# Patient Record
Sex: FEMALE | Race: BLACK OR AFRICAN AMERICAN | NOT HISPANIC OR LATINO | Employment: FULL TIME | URBAN - METROPOLITAN AREA
[De-identification: names, ages, dates, MRNs, and addresses within clinical notes are randomized per-mention and may not be internally consistent; named-entity substitution may affect disease eponyms.]

---

## 2017-06-14 ENCOUNTER — HISTORICAL (OUTPATIENT)
Dept: FAMILY MEDICINE | Facility: CLINIC | Age: 27
End: 2017-06-14

## 2018-02-05 ENCOUNTER — HISTORICAL (OUTPATIENT)
Dept: ADMINISTRATIVE | Facility: HOSPITAL | Age: 28
End: 2018-02-05

## 2018-02-05 LAB
ABS NEUT (OLG): 2.38 X10(3)/MCL
ALBUMIN SERPL-MCNC: 4.2 GM/DL (ref 3.4–5)
ALBUMIN/GLOB SERPL: 1 RATIO (ref 1–2)
ALP SERPL-CCNC: 76 UNIT/L (ref 45–117)
ALT SERPL-CCNC: 34 UNIT/L (ref 12–78)
AST SERPL-CCNC: 24 UNIT/L (ref 15–37)
BASOPHILS NFR BLD MANUAL: 1 %
BILIRUB SERPL-MCNC: 0.5 MG/DL (ref 0.2–1)
BILIRUBIN DIRECT+TOT PNL SERPL-MCNC: 0.1 MG/DL
BILIRUBIN DIRECT+TOT PNL SERPL-MCNC: 0.4 MG/DL
BUN SERPL-MCNC: 10 MG/DL (ref 7–18)
CALCIUM SERPL-MCNC: 9.4 MG/DL (ref 8.5–10.1)
CHLORIDE SERPL-SCNC: 105 MMOL/L (ref 98–107)
CO2 SERPL-SCNC: 24 MMOL/L (ref 21–32)
CREAT SERPL-MCNC: 0.8 MG/DL (ref 0.6–1.3)
D DIMER PPP IA.FEU-MCNC: 0.28 MCG/ML FEU
EOSINOPHIL NFR BLD MANUAL: 0 %
ERYTHROCYTE [DISTWIDTH] IN BLOOD BY AUTOMATED COUNT: 12.8 % (ref 11.5–14.5)
GLOBULIN SER-MCNC: 4.5 GM/ML (ref 2.3–3.5)
GLUCOSE SERPL-MCNC: 86 MG/DL (ref 74–106)
GRANULOCYTES NFR BLD MANUAL: 56 % (ref 43–75)
HCT VFR BLD AUTO: 39.7 % (ref 35–46)
HGB BLD-MCNC: 13 GM/DL (ref 12–16)
LYMPHOCYTES NFR BLD MANUAL: 31 % (ref 20.5–51.1)
MCH RBC QN AUTO: 29.1 PG (ref 26–34)
MCHC RBC AUTO-ENTMCNC: 32.7 GM/DL (ref 31–37)
MCV RBC AUTO: 88.8 FL (ref 80–100)
MONOCYTES NFR BLD MANUAL: 7 % (ref 2–9)
PLATELET # BLD AUTO: 303 X10(3)/MCL (ref 130–400)
PLATELET # BLD EST: ADEQUATE 10*3/UL
PMV BLD AUTO: 10.9 FL (ref 7.4–10.4)
POTASSIUM SERPL-SCNC: 4 MMOL/L (ref 3.5–5.1)
PROT SERPL-MCNC: 8.7 GM/DL (ref 6.4–8.2)
RBC # BLD AUTO: 4.47 X10(6)/MCL (ref 4–5.2)
RBC MORPH BLD: NORMAL
SODIUM SERPL-SCNC: 138 MMOL/L (ref 136–145)
T4 SERPL-MCNC: 11.4 MCG/DL (ref 4.8–13.9)
TSH SERPL-ACNC: 0.72 MIU/L (ref 0.36–3.74)
WBC # SPEC AUTO: 4.7 X10(3)/MCL (ref 4.5–11)

## 2018-02-14 ENCOUNTER — HISTORICAL (OUTPATIENT)
Dept: RADIOLOGY | Facility: HOSPITAL | Age: 28
End: 2018-02-14

## 2018-03-09 ENCOUNTER — HISTORICAL (OUTPATIENT)
Dept: FAMILY MEDICINE | Facility: CLINIC | Age: 28
End: 2018-03-09

## 2018-04-09 ENCOUNTER — HISTORICAL (OUTPATIENT)
Dept: FAMILY MEDICINE | Facility: CLINIC | Age: 28
End: 2018-04-09

## 2018-04-09 LAB
ABS NEUT (OLG): 3.25 X10(3)/MCL (ref 2.1–9.2)
ALBUMIN SERPL-MCNC: 3.9 GM/DL (ref 3.4–5)
ALBUMIN/GLOB SERPL: 1 RATIO (ref 1–2)
ALP SERPL-CCNC: 82 UNIT/L (ref 45–117)
ALT SERPL-CCNC: 19 UNIT/L (ref 12–78)
AMPHET UR QL SCN: NEGATIVE
AST SERPL-CCNC: 14 UNIT/L (ref 15–37)
BARBITURATE SCN PRESENT UR: NEGATIVE
BASOPHILS # BLD AUTO: 0.02 X10(3)/MCL
BASOPHILS NFR BLD AUTO: 0 %
BENZODIAZ UR QL SCN: NEGATIVE
BILIRUB SERPL-MCNC: 0.3 MG/DL (ref 0.2–1)
BILIRUBIN DIRECT+TOT PNL SERPL-MCNC: 0.1 MG/DL
BILIRUBIN DIRECT+TOT PNL SERPL-MCNC: 0.2 MG/DL
BUN SERPL-MCNC: 11 MG/DL (ref 7–18)
CALCIUM SERPL-MCNC: 9 MG/DL (ref 8.5–10.1)
CANNABINOIDS UR QL SCN: NEGATIVE
CHLORIDE SERPL-SCNC: 106 MMOL/L (ref 98–107)
CO2 SERPL-SCNC: 26 MMOL/L (ref 21–32)
COCAINE UR QL SCN: NEGATIVE
CREAT SERPL-MCNC: 1.1 MG/DL (ref 0.6–1.3)
CRP SERPL-MCNC: <0.3 MG/DL
EOSINOPHIL # BLD AUTO: 0.05 X10(3)/MCL
EOSINOPHIL NFR BLD AUTO: 1 %
ERYTHROCYTE [DISTWIDTH] IN BLOOD BY AUTOMATED COUNT: 12.9 % (ref 11.5–14.5)
ERYTHROCYTE [SEDIMENTATION RATE] IN BLOOD: 18 MM/HR (ref 0–20)
EST. AVERAGE GLUCOSE BLD GHB EST-MCNC: 91 MG/DL
GLOBULIN SER-MCNC: 4.3 GM/ML (ref 2.3–3.5)
GLUCOSE SERPL-MCNC: 96 MG/DL (ref 74–106)
HBA1C MFR BLD: 4.8 % (ref 4.2–6.3)
HCT VFR BLD AUTO: 37.6 % (ref 35–46)
HGB BLD-MCNC: 11.7 GM/DL (ref 12–16)
HIV 1+2 AB+HIV1 P24 AG SERPL QL IA: NONREACTIVE
IMM GRANULOCYTES # BLD AUTO: 0.01 10*3/UL
IMM GRANULOCYTES NFR BLD AUTO: 0 %
LYMPHOCYTES # BLD AUTO: 2.43 X10(3)/MCL
LYMPHOCYTES NFR BLD AUTO: 40 % (ref 13–40)
MCH RBC QN AUTO: 28.5 PG (ref 26–34)
MCHC RBC AUTO-ENTMCNC: 31.1 GM/DL (ref 31–37)
MCV RBC AUTO: 91.7 FL (ref 80–100)
MONOCYTES # BLD AUTO: 0.39 X10(3)/MCL
MONOCYTES NFR BLD AUTO: 6 % (ref 4–12)
NEUTROPHILS # BLD AUTO: 3.25 X10(3)/MCL
NEUTROPHILS NFR BLD AUTO: 53 X10(3)/MCL
OPIATES UR QL SCN: NEGATIVE
PCP UR QL: NEGATIVE
PH UR STRIP.AUTO: 5.5 [PH] (ref 5–8)
PLATELET # BLD AUTO: 295 X10(3)/MCL (ref 130–400)
PMV BLD AUTO: 10.4 FL (ref 7.4–10.4)
POTASSIUM SERPL-SCNC: 3.5 MMOL/L (ref 3.5–5.1)
PROT SERPL-MCNC: 8.2 GM/DL (ref 6.4–8.2)
RBC # BLD AUTO: 4.1 X10(6)/MCL (ref 4–5.2)
SODIUM SERPL-SCNC: 136 MMOL/L (ref 136–145)
TEMPERATURE, URINE (OHS): 25 DEGC (ref 20–25)
TSH SERPL-ACNC: 0.54 MIU/L (ref 0.36–3.74)
WBC # SPEC AUTO: 6.2 X10(3)/MCL (ref 4.5–11)

## 2018-05-08 ENCOUNTER — HISTORICAL (OUTPATIENT)
Dept: RADIOLOGY | Facility: HOSPITAL | Age: 28
End: 2018-05-08

## 2018-06-01 ENCOUNTER — HISTORICAL (OUTPATIENT)
Dept: RADIOLOGY | Facility: HOSPITAL | Age: 28
End: 2018-06-01

## 2018-11-02 ENCOUNTER — HISTORICAL (OUTPATIENT)
Dept: LAB | Facility: HOSPITAL | Age: 28
End: 2018-11-02

## 2018-11-02 LAB
ABS NEUT (OLG): 2.68 X10(3)/MCL (ref 2.1–9.2)
BASOPHILS # BLD AUTO: 0.02 X10(3)/MCL
BASOPHILS NFR BLD AUTO: 0 %
EOSINOPHIL # BLD AUTO: 0.07 X10(3)/MCL
EOSINOPHIL NFR BLD AUTO: 1 %
ERYTHROCYTE [DISTWIDTH] IN BLOOD BY AUTOMATED COUNT: 12.6 % (ref 11.5–14.5)
ERYTHROCYTE [SEDIMENTATION RATE] IN BLOOD: 13 MM/HR (ref 0–20)
HCT VFR BLD AUTO: 39.1 % (ref 35–46)
HGB BLD-MCNC: 12.4 GM/DL (ref 12–16)
IMM GRANULOCYTES # BLD AUTO: 0.01 10*3/UL
IMM GRANULOCYTES NFR BLD AUTO: 0 %
LYMPHOCYTES # BLD AUTO: 2.42 X10(3)/MCL
LYMPHOCYTES NFR BLD AUTO: 43 % (ref 13–40)
MCH RBC QN AUTO: 28.6 PG (ref 26–34)
MCHC RBC AUTO-ENTMCNC: 31.7 GM/DL (ref 31–37)
MCV RBC AUTO: 90.1 FL (ref 80–100)
MONOCYTES # BLD AUTO: 0.4 X10(3)/MCL
MONOCYTES NFR BLD AUTO: 7 % (ref 4–12)
NEUTROPHILS # BLD AUTO: 2.68 X10(3)/MCL
NEUTROPHILS NFR BLD AUTO: 48 X10(3)/MCL
PLATELET # BLD AUTO: 301 X10(3)/MCL (ref 130–400)
PMV BLD AUTO: 10.4 FL (ref 7.4–10.4)
RBC # BLD AUTO: 4.34 X10(6)/MCL (ref 4–5.2)
WBC # SPEC AUTO: 5.6 X10(3)/MCL (ref 4.5–11)

## 2018-11-16 ENCOUNTER — HISTORICAL (OUTPATIENT)
Dept: ADMINISTRATIVE | Facility: HOSPITAL | Age: 28
End: 2018-11-16

## 2018-11-16 LAB
T3FREE SERPL-MCNC: 2.26 PG/ML (ref 2.18–3.98)
T4 FREE SERPL-MCNC: 0.92 NG/DL (ref 0.76–1.46)
TSH SERPL-ACNC: 0.43 MIU/L (ref 0.36–3.74)

## 2019-07-29 ENCOUNTER — HISTORICAL (OUTPATIENT)
Dept: ADMINISTRATIVE | Facility: HOSPITAL | Age: 29
End: 2019-07-29

## 2019-07-29 LAB
ABS NEUT (OLG): 2.12 X10(3)/MCL (ref 2.1–9.2)
ALBUMIN SERPL-MCNC: 4.1 GM/DL (ref 3.4–5)
ALBUMIN/GLOB SERPL: 1 RATIO (ref 1.1–2)
ALP SERPL-CCNC: 77 UNIT/L (ref 45–117)
ALT SERPL-CCNC: 19 UNIT/L (ref 12–78)
APPEARANCE, UA: ABNORMAL
AST SERPL-CCNC: 12 UNIT/L (ref 15–37)
BACTERIA #/AREA URNS AUTO: ABNORMAL /[HPF]
BASOPHILS # BLD AUTO: 0.02 X10(3)/MCL
BASOPHILS NFR BLD AUTO: 0 %
BILIRUB SERPL-MCNC: 0.4 MG/DL (ref 0.2–1)
BILIRUB UR QL STRIP: NEGATIVE
BILIRUBIN DIRECT+TOT PNL SERPL-MCNC: 0.1 MG/DL
BILIRUBIN DIRECT+TOT PNL SERPL-MCNC: 0.3 MG/DL
BUN SERPL-MCNC: 12 MG/DL (ref 7–18)
CALCIUM SERPL-MCNC: 9.5 MG/DL (ref 8.5–10.1)
CHLORIDE SERPL-SCNC: 107 MMOL/L (ref 98–107)
CO2 SERPL-SCNC: 27 MMOL/L (ref 21–32)
COLOR UR: ABNORMAL
CREAT SERPL-MCNC: 0.9 MG/DL (ref 0.6–1.3)
CREAT UR-MCNC: 485 MG/DL
EOSINOPHIL # BLD AUTO: 0.05 10*3/UL
EOSINOPHIL NFR BLD AUTO: 1 %
ERYTHROCYTE [DISTWIDTH] IN BLOOD BY AUTOMATED COUNT: 13.5 % (ref 11.5–14.5)
EST. AVERAGE GLUCOSE BLD GHB EST-MCNC: 91 MG/DL
GLOBULIN SER-MCNC: 4 GM/ML (ref 2.3–3.5)
GLUCOSE (UA): NORMAL
GLUCOSE SERPL-MCNC: 81 MG/DL (ref 74–106)
HBA1C MFR BLD: 4.8 % (ref 4.2–6.3)
HCT VFR BLD AUTO: 38.9 % (ref 35–46)
HGB BLD-MCNC: 11.9 GM/DL (ref 12–16)
HGB UR QL STRIP: 0.1 MG/DL
HYALINE CASTS #/AREA URNS LPF: ABNORMAL /[LPF]
KETONES UR QL STRIP: ABNORMAL
LEUKOCYTE ESTERASE UR QL STRIP: NEGATIVE
LYMPHOCYTES # BLD AUTO: 2.13 X10(3)/MCL
LYMPHOCYTES NFR BLD AUTO: 46 % (ref 13–40)
MCH RBC QN AUTO: 27.5 PG (ref 26–34)
MCHC RBC AUTO-ENTMCNC: 30.6 GM/DL (ref 31–37)
MCV RBC AUTO: 89.8 FL (ref 80–100)
MICROALBUMIN UR-MCNC: 20.3 MG/L (ref 0–19)
MICROALBUMIN/CREAT RATIO PNL UR: 4.2 MCG/MG CR (ref 0–29)
MONOCYTES # BLD AUTO: 0.33 X10(3)/MCL
MONOCYTES NFR BLD AUTO: 7 % (ref 0–24)
NEUTROPHILS # BLD AUTO: 2.12 X10(3)/MCL
NEUTROPHILS NFR BLD AUTO: 46 X10(3)/MCL
NITRITE UR QL STRIP: NEGATIVE
PH UR STRIP: 5.5 [PH] (ref 4.5–8)
PLATELET # BLD AUTO: 307 X10(3)/MCL (ref 130–400)
PMV BLD AUTO: 10.6 FL (ref 7.4–10.4)
POTASSIUM SERPL-SCNC: 3.7 MMOL/L (ref 3.5–5.1)
PROT SERPL-MCNC: 8.1 GM/DL (ref 6.4–8.2)
PROT UR QL STRIP: 20 MG/DL
RBC # BLD AUTO: 4.33 X10(6)/MCL (ref 4–5.2)
RBC #/AREA URNS AUTO: ABNORMAL /[HPF]
SODIUM SERPL-SCNC: 140 MMOL/L (ref 136–145)
SP GR UR STRIP: 1.04 (ref 1–1.03)
SQUAMOUS #/AREA URNS LPF: ABNORMAL /[LPF]
T4 FREE SERPL-MCNC: 0.95 NG/DL (ref 0.76–1.46)
TSH SERPL-ACNC: 0.49 MIU/L (ref 0.36–3.74)
UROBILINOGEN UR STRIP-ACNC: NORMAL
WBC # SPEC AUTO: 4.6 X10(3)/MCL (ref 4.5–11)
WBC #/AREA URNS AUTO: ABNORMAL /HPF

## 2019-09-06 ENCOUNTER — HISTORICAL (OUTPATIENT)
Dept: RADIOLOGY | Facility: HOSPITAL | Age: 29
End: 2019-09-06

## 2020-05-29 ENCOUNTER — HISTORICAL (OUTPATIENT)
Dept: ADMINISTRATIVE | Facility: HOSPITAL | Age: 30
End: 2020-05-29

## 2020-05-29 LAB
CHOLEST SERPL-MCNC: 202 MG/DL
CHOLEST/HDLC SERPL: 3.8 {RATIO} (ref 0–4.4)
HDLC SERPL-MCNC: 53 MG/DL (ref 40–59)
LDLC SERPL CALC-MCNC: 125 MG/DL
POC BETA-HCG (QUAL): NEGATIVE
TRIGL SERPL-MCNC: 122 MG/DL
VLDLC SERPL CALC-MCNC: 24 MG/DL

## 2020-10-22 ENCOUNTER — HISTORICAL (OUTPATIENT)
Dept: INTERNAL MEDICINE | Facility: CLINIC | Age: 30
End: 2020-10-22

## 2020-10-22 LAB
ABS NEUT (OLG): 2.93 X10(3)/MCL (ref 2.1–9.2)
ALBUMIN SERPL-MCNC: 4.1 GM/DL (ref 3.5–5)
ALBUMIN/GLOB SERPL: 1.1 RATIO (ref 1.1–2)
ALP SERPL-CCNC: 62 UNIT/L (ref 40–150)
ALT SERPL-CCNC: 14 UNIT/L (ref 0–55)
AST SERPL-CCNC: 21 UNIT/L (ref 5–34)
BASOPHILS # BLD AUTO: 0 X10(3)/MCL (ref 0–0.2)
BASOPHILS NFR BLD AUTO: 0 %
BILIRUB SERPL-MCNC: 0.3 MG/DL
BILIRUBIN DIRECT+TOT PNL SERPL-MCNC: 0.1 MG/DL (ref 0–0.5)
BILIRUBIN DIRECT+TOT PNL SERPL-MCNC: 0.2 MG/DL (ref 0–0.8)
BUN SERPL-MCNC: 12 MG/DL (ref 7–18.7)
CALCIUM SERPL-MCNC: 9.5 MG/DL (ref 8.4–10.2)
CHLORIDE SERPL-SCNC: 107 MMOL/L (ref 98–107)
CHOLEST SERPL-MCNC: 162 MG/DL
CHOLEST/HDLC SERPL: 4 {RATIO} (ref 0–5)
CO2 SERPL-SCNC: 22 MMOL/L (ref 22–29)
CREAT SERPL-MCNC: 0.89 MG/DL (ref 0.55–1.02)
DEPRECATED CALCIDIOL+CALCIFEROL SERPL-MC: 13.1 NG/ML (ref 30–80)
EOSINOPHIL # BLD AUTO: 0.1 X10(3)/MCL (ref 0–0.9)
EOSINOPHIL NFR BLD AUTO: 1 %
ERYTHROCYTE [DISTWIDTH] IN BLOOD BY AUTOMATED COUNT: 12.9 % (ref 11.5–14.5)
EST. AVERAGE GLUCOSE BLD GHB EST-MCNC: 88.2 MG/DL
GLOBULIN SER-MCNC: 3.9 GM/DL (ref 2.4–3.5)
GLUCOSE SERPL-MCNC: 80 MG/DL (ref 74–100)
HBA1C MFR BLD: 4.7 %
HCT VFR BLD AUTO: 38.5 % (ref 35–46)
HDLC SERPL-MCNC: 46 MG/DL (ref 35–60)
HGB BLD-MCNC: 12 GM/DL (ref 12–16)
IMM GRANULOCYTES # BLD AUTO: 0.01 10*3/UL
IMM GRANULOCYTES NFR BLD AUTO: 0 %
LDLC SERPL CALC-MCNC: 96 MG/DL (ref 50–140)
LYMPHOCYTES # BLD AUTO: 2.7 X10(3)/MCL (ref 0.6–4.6)
LYMPHOCYTES NFR BLD AUTO: 43 %
MCH RBC QN AUTO: 28.2 PG (ref 26–34)
MCHC RBC AUTO-ENTMCNC: 31.2 GM/DL (ref 31–37)
MCV RBC AUTO: 90.6 FL (ref 80–100)
MONOCYTES # BLD AUTO: 0.5 X10(3)/MCL (ref 0.1–1.3)
MONOCYTES NFR BLD AUTO: 8 %
NEUTROPHILS # BLD AUTO: 2.93 X10(3)/MCL (ref 2.1–9.2)
NEUTROPHILS NFR BLD AUTO: 47 %
PLATELET # BLD AUTO: 326 X10(3)/MCL (ref 130–400)
PMV BLD AUTO: 11 FL (ref 7.4–10.4)
POTASSIUM SERPL-SCNC: 3.7 MMOL/L (ref 3.5–5.1)
PROT SERPL-MCNC: 8 GM/DL (ref 6.4–8.3)
RBC # BLD AUTO: 4.25 X10(6)/MCL (ref 4–5.2)
SODIUM SERPL-SCNC: 137 MMOL/L (ref 136–145)
TRIGL SERPL-MCNC: 98 MG/DL (ref 37–140)
TSH SERPL-ACNC: 0.51 UIU/ML (ref 0.35–4.94)
VLDLC SERPL CALC-MCNC: 20 MG/DL
WBC # SPEC AUTO: 6.2 X10(3)/MCL (ref 4.5–11)

## 2020-12-18 ENCOUNTER — HISTORICAL (OUTPATIENT)
Dept: RADIOLOGY | Facility: HOSPITAL | Age: 30
End: 2020-12-18

## 2021-01-27 ENCOUNTER — HISTORICAL (OUTPATIENT)
Dept: RADIOLOGY | Facility: HOSPITAL | Age: 31
End: 2021-01-27

## 2021-02-08 ENCOUNTER — HISTORICAL (OUTPATIENT)
Dept: RADIOLOGY | Facility: HOSPITAL | Age: 31
End: 2021-02-08

## 2021-07-01 ENCOUNTER — PATIENT MESSAGE (OUTPATIENT)
Dept: ADMINISTRATIVE | Facility: OTHER | Age: 31
End: 2021-07-01

## 2021-09-27 ENCOUNTER — HISTORICAL (OUTPATIENT)
Dept: RADIOLOGY | Facility: HOSPITAL | Age: 31
End: 2021-09-27

## 2021-11-18 ENCOUNTER — HISTORICAL (OUTPATIENT)
Dept: LAB | Facility: HOSPITAL | Age: 31
End: 2021-11-18

## 2021-11-18 LAB
ABS NEUT (OLG): 2.44 X10(3)/MCL (ref 2.1–9.2)
ALBUMIN SERPL-MCNC: 3.8 GM/DL (ref 3.5–5)
ALBUMIN/GLOB SERPL: 1 RATIO (ref 1.1–2)
ALP SERPL-CCNC: 103 UNIT/L (ref 40–150)
ALT SERPL-CCNC: 65 UNIT/L (ref 0–55)
AST SERPL-CCNC: 41 UNIT/L (ref 5–34)
BASOPHILS # BLD AUTO: 0 X10(3)/MCL (ref 0–0.2)
BASOPHILS NFR BLD AUTO: 0 %
BILIRUB SERPL-MCNC: 0.4 MG/DL
BILIRUBIN DIRECT+TOT PNL SERPL-MCNC: 0.2 MG/DL (ref 0–0.5)
BILIRUBIN DIRECT+TOT PNL SERPL-MCNC: 0.2 MG/DL (ref 0–0.8)
BUN SERPL-MCNC: 11.6 MG/DL (ref 7–18.7)
CALCIUM SERPL-MCNC: 9.3 MG/DL (ref 8.7–10.5)
CHLORIDE SERPL-SCNC: 107 MMOL/L (ref 98–107)
CHOLEST SERPL-MCNC: 163 MG/DL
CHOLEST/HDLC SERPL: 3 {RATIO} (ref 0–5)
CO2 SERPL-SCNC: 22 MMOL/L (ref 22–29)
CREAT SERPL-MCNC: 0.86 MG/DL (ref 0.55–1.02)
DEPRECATED CALCIDIOL+CALCIFEROL SERPL-MC: 14.4 NG/ML (ref 30–80)
EOSINOPHIL # BLD AUTO: 0.1 X10(3)/MCL (ref 0–0.9)
EOSINOPHIL NFR BLD AUTO: 2 %
ERYTHROCYTE [DISTWIDTH] IN BLOOD BY AUTOMATED COUNT: 17.5 % (ref 11.5–14.5)
GLOBULIN SER-MCNC: 3.8 GM/DL (ref 2.4–3.5)
GLUCOSE SERPL-MCNC: 106 MG/DL (ref 74–100)
HCT VFR BLD AUTO: 36.8 % (ref 35–46)
HDLC SERPL-MCNC: 49 MG/DL (ref 35–60)
HGB BLD-MCNC: 11 GM/DL (ref 12–16)
IMM GRANULOCYTES # BLD AUTO: 0.02 10*3/UL
IMM GRANULOCYTES NFR BLD AUTO: 0 %
LDLC SERPL CALC-MCNC: 92 MG/DL (ref 50–140)
LYMPHOCYTES # BLD AUTO: 1.9 X10(3)/MCL (ref 0.6–4.6)
LYMPHOCYTES NFR BLD AUTO: 39 %
MCH RBC QN AUTO: 25.8 PG (ref 26–34)
MCHC RBC AUTO-ENTMCNC: 29.9 GM/DL (ref 31–37)
MCV RBC AUTO: 86.2 FL (ref 80–100)
MONOCYTES # BLD AUTO: 0.3 X10(3)/MCL (ref 0.1–1.3)
MONOCYTES NFR BLD AUTO: 7 %
NEUTROPHILS # BLD AUTO: 2.44 X10(3)/MCL (ref 2.1–9.2)
NEUTROPHILS NFR BLD AUTO: 52 %
NRBC BLD AUTO-RTO: 0 % (ref 0–0.2)
PLATELET # BLD AUTO: 251 X10(3)/MCL (ref 130–400)
PMV BLD AUTO: 10.3 FL (ref 7.4–10.4)
POTASSIUM SERPL-SCNC: 3.5 MMOL/L (ref 3.5–5.1)
PROT SERPL-MCNC: 7.6 GM/DL (ref 6.4–8.3)
RBC # BLD AUTO: 4.27 X10(6)/MCL (ref 4–5.2)
SODIUM SERPL-SCNC: 139 MMOL/L (ref 136–145)
TRIGL SERPL-MCNC: 108 MG/DL (ref 37–140)
TSH SERPL-ACNC: 0.57 UIU/ML (ref 0.35–4.94)
VLDLC SERPL CALC-MCNC: 22 MG/DL
WBC # SPEC AUTO: 4.7 X10(3)/MCL (ref 4.5–11)

## 2021-12-21 LAB — PAP RECOMMENDATION EXT: NORMAL

## 2022-04-11 ENCOUNTER — HISTORICAL (OUTPATIENT)
Dept: ADMINISTRATIVE | Facility: HOSPITAL | Age: 32
End: 2022-04-11
Payer: MEDICAID

## 2022-04-25 VITALS
DIASTOLIC BLOOD PRESSURE: 81 MMHG | BODY MASS INDEX: 42.43 KG/M2 | WEIGHT: 270.31 LBS | HEIGHT: 67 IN | SYSTOLIC BLOOD PRESSURE: 124 MMHG | OXYGEN SATURATION: 97 %

## 2022-06-02 ENCOUNTER — HOSPITAL ENCOUNTER (OUTPATIENT)
Dept: RADIOLOGY | Facility: HOSPITAL | Age: 32
Discharge: HOME | End: 2022-06-02
Attending: NURSE PRACTITIONER
Payer: COMMERCIAL

## 2022-06-02 ENCOUNTER — LAB VISIT (OUTPATIENT)
Dept: LAB | Facility: HOSPITAL | Age: 32
End: 2022-06-02
Attending: NURSE PRACTITIONER
Payer: COMMERCIAL

## 2022-06-02 ENCOUNTER — OFFICE VISIT (OUTPATIENT)
Dept: FAMILY MEDICINE | Facility: CLINIC | Age: 32
End: 2022-06-02
Payer: COMMERCIAL

## 2022-06-02 VITALS
BODY MASS INDEX: 43.62 KG/M2 | HEART RATE: 92 BPM | WEIGHT: 271.38 LBS | TEMPERATURE: 98 F | DIASTOLIC BLOOD PRESSURE: 79 MMHG | HEIGHT: 66 IN | SYSTOLIC BLOOD PRESSURE: 123 MMHG

## 2022-06-02 DIAGNOSIS — R59.9 LYMPH NODE ENLARGEMENT: ICD-10-CM

## 2022-06-02 DIAGNOSIS — M79.629 PAIN IN AXILLA, UNSPECIFIED LATERALITY: ICD-10-CM

## 2022-06-02 DIAGNOSIS — R59.9 LYMPH NODE ENLARGEMENT: Primary | ICD-10-CM

## 2022-06-02 LAB
BASOPHILS # BLD AUTO: 0.03 X10(3)/MCL (ref 0–0.2)
BASOPHILS NFR BLD AUTO: 0.7 %
CRP SERPL-MCNC: 0.7 MG/L
EOSINOPHIL # BLD AUTO: 0.09 X10(3)/MCL (ref 0–0.9)
EOSINOPHIL NFR BLD AUTO: 2 %
ERYTHROCYTE [DISTWIDTH] IN BLOOD BY AUTOMATED COUNT: 13.6 % (ref 11.5–17)
ERYTHROCYTE [SEDIMENTATION RATE] IN BLOOD: 20 MM/HR (ref 0–20)
HCT VFR BLD AUTO: 40 % (ref 37–47)
HGB BLD-MCNC: 12.2 GM/DL (ref 12–16)
IMM GRANULOCYTES # BLD AUTO: 0 X10(3)/MCL (ref 0–0.02)
IMM GRANULOCYTES NFR BLD AUTO: 0 % (ref 0–0.43)
LYMPHOCYTES # BLD AUTO: 1.26 X10(3)/MCL (ref 0.6–4.6)
LYMPHOCYTES NFR BLD AUTO: 27.8 %
MCH RBC QN AUTO: 27 PG (ref 27–31)
MCHC RBC AUTO-ENTMCNC: 30.5 MG/DL (ref 33–36)
MCV RBC AUTO: 88.5 FL (ref 80–94)
MONOCYTES # BLD AUTO: 0.39 X10(3)/MCL (ref 0.1–1.3)
MONOCYTES NFR BLD AUTO: 8.6 %
NEUTROPHILS # BLD AUTO: 2.8 X10(3)/MCL (ref 2.1–9.2)
NEUTROPHILS NFR BLD AUTO: 60.9 %
PLATELET # BLD AUTO: 290 X10(3)/MCL (ref 130–400)
PMV BLD AUTO: 10.1 FL (ref 9.4–12.4)
RBC # BLD AUTO: 4.52 X10(6)/MCL (ref 4.2–5.4)
WBC # SPEC AUTO: 4.5 X10(3)/MCL (ref 4.5–11.5)

## 2022-06-02 PROCEDURE — 3078F PR MOST RECENT DIASTOLIC BLOOD PRESSURE < 80 MM HG: ICD-10-PCS | Mod: CPTII,,, | Performed by: NURSE PRACTITIONER

## 2022-06-02 PROCEDURE — 85025 COMPLETE CBC W/AUTO DIFF WBC: CPT

## 2022-06-02 PROCEDURE — 1160F RVW MEDS BY RX/DR IN RCRD: CPT | Mod: CPTII,,, | Performed by: NURSE PRACTITIONER

## 2022-06-02 PROCEDURE — 36415 COLL VENOUS BLD VENIPUNCTURE: CPT

## 2022-06-02 PROCEDURE — 85651 RBC SED RATE NONAUTOMATED: CPT

## 2022-06-02 PROCEDURE — 1159F MED LIST DOCD IN RCRD: CPT | Mod: CPTII,,, | Performed by: NURSE PRACTITIONER

## 2022-06-02 PROCEDURE — 86235 NUCLEAR ANTIGEN ANTIBODY: CPT

## 2022-06-02 PROCEDURE — 71046 X-RAY EXAM CHEST 2 VIEWS: CPT | Mod: TC

## 2022-06-02 PROCEDURE — 3074F SYST BP LT 130 MM HG: CPT | Mod: CPTII,,, | Performed by: NURSE PRACTITIONER

## 2022-06-02 PROCEDURE — 1160F PR REVIEW ALL MEDS BY PRESCRIBER/CLIN PHARMACIST DOCUMENTED: ICD-10-PCS | Mod: CPTII,,, | Performed by: NURSE PRACTITIONER

## 2022-06-02 PROCEDURE — 3008F PR BODY MASS INDEX (BMI) DOCUMENTED: ICD-10-PCS | Mod: CPTII,,, | Performed by: NURSE PRACTITIONER

## 2022-06-02 PROCEDURE — 1159F PR MEDICATION LIST DOCUMENTED IN MEDICAL RECORD: ICD-10-PCS | Mod: CPTII,,, | Performed by: NURSE PRACTITIONER

## 2022-06-02 PROCEDURE — 99213 OFFICE O/P EST LOW 20 MIN: CPT | Mod: ,,, | Performed by: NURSE PRACTITIONER

## 2022-06-02 PROCEDURE — 99213 PR OFFICE/OUTPT VISIT, EST, LEVL III, 20-29 MIN: ICD-10-PCS | Mod: ,,, | Performed by: NURSE PRACTITIONER

## 2022-06-02 PROCEDURE — 3074F PR MOST RECENT SYSTOLIC BLOOD PRESSURE < 130 MM HG: ICD-10-PCS | Mod: CPTII,,, | Performed by: NURSE PRACTITIONER

## 2022-06-02 PROCEDURE — 86140 C-REACTIVE PROTEIN: CPT

## 2022-06-02 PROCEDURE — 3008F BODY MASS INDEX DOCD: CPT | Mod: CPTII,,, | Performed by: NURSE PRACTITIONER

## 2022-06-02 PROCEDURE — 3078F DIAST BP <80 MM HG: CPT | Mod: CPTII,,, | Performed by: NURSE PRACTITIONER

## 2022-06-02 NOTE — PROGRESS NOTES
"Subjective:       Patient ID: Jazmine Welsh is a 31 y.o. female.    Chief Complaint: soreness (Soreness to lymph sites x 1 week )      HPI   This is a 31-year-old  female presents to the clinic today complaining of pain and swelling in her lymph nodes.  She states she has pain in her growing her neck and her arms everywhere that her lymph nodes are present.  She does have a history of breast tumor removed she is following up with high risk breast clinic.   Patient states the pain has been for the last 2 weeks.    She denies any nausea, fever weight loss , or night sweats.     Patient also reports her uncle had no cancer in with her history of breast cancer she is really concerned and would like to follow-up with the oncologist.  She does also report that her left armpit has always a large lump have been sober the her son about 10 years ago that she had a clogged duct   Review of Systems   Constitutional: Negative for appetite change, fatigue, fever and unexpected weight change.   HENT: Negative.    Eyes: Negative.    Respiratory: Negative.  Negative for shortness of breath and wheezing.    Cardiovascular: Negative.    Gastrointestinal: Negative.    Endocrine: Negative.    Genitourinary: Negative.    Integumentary:  Negative.   Allergic/Immunologic: Negative.    Neurological: Negative for dizziness, weakness and headaches.   Hematological: Positive for adenopathy.   Psychiatric/Behavioral: Negative.  Negative for suicidal ideas.   All other systems reviewed and are negative.           The patients  hx, allergies, medication and and problem list were updated as appropriate.    Objective:       /79   Pulse 92   Temp 98 °F (36.7 °C)   Ht 5' 6" (1.676 m)   Wt 123.1 kg (271 lb 6.4 oz)   BMI 43.81 kg/m²      Physical Exam  Constitutional:       Appearance: Normal appearance.   HENT:      Head: Normocephalic.   Eyes:      Pupils: Pupils are equal, round, and reactive to light. "   Cardiovascular:      Rate and Rhythm: Normal rate.      Pulses: Normal pulses.   Pulmonary:      Effort: Pulmonary effort is normal.   Chest:   Breasts:      Right: Axillary adenopathy present.      Left: Axillary adenopathy present.       Abdominal:      General: Bowel sounds are normal.      Palpations: Abdomen is soft.   Musculoskeletal:         General: Normal range of motion.      Cervical back: Neck supple.   Lymphadenopathy:      Cervical: Cervical adenopathy present.      Upper Body:      Right upper body: Axillary adenopathy present.      Left upper body: Axillary adenopathy present.   Skin:     General: Skin is warm and dry.   Neurological:      Mental Status: She is alert and oriented to person, place, and time.   Psychiatric:         Mood and Affect: Mood normal.         Behavior: Behavior normal.         Judgment: Judgment normal.           Assessment/Plan   1. Lymph node enlargement  -     X-Ray Chest PA And Lateral  -     CBC Auto Differential  -     JASMYN  -     C-reactive protein  -     Sedimentation rate    2. Pain in axilla, unspecified laterality  -     X-Ray Chest PA And Lateral  -     CBC Auto Differential  -     JASMYN  -     C-reactive protein  -     Sedimentation rate     pending results - referral to hem/oncology     No results found for this or any previous visit (from the past 24 hour(s)).   No follow-ups on file.       Discussed with pt today labs - wnl   She is still come sound about pain in her groin area in bilateral under arm.  Patient states that she is always suffer with spleen pain. She is very concerned about cancer due to her hx of breast cancer.    she states she is in constant pain and aches in her lymph nodes in the groin and axila.     Chest xray recommends Ct in 3 months - ordered and discussed with pt

## 2022-06-03 LAB
ANTINUCLEAR ANTIBODY SCREEN (OHS): NEGATIVE
CENTROMERE PROTEIN ANTIBODY (OHS): NEGATIVE
DSDNA ANTIBODY (OHS): NEGATIVE
JO-1 ANTIBODY (OHS): NEGATIVE
RNP70 ANTIBODY (OHS): NEGATIVE
SCLERODERMA (SCL-70S) ANTIBODY (OHS): NEGATIVE
SMITH DP IGG (OHS): NEGATIVE
SSA(RO) ANTIBODY (OHS): NEGATIVE
SSB(LA) ANTIBODY (OHS): NEGATIVE
U1RNP ANTIBODY (OHS): NEGATIVE

## 2022-06-27 ENCOUNTER — TELEPHONE (OUTPATIENT)
Dept: FAMILY MEDICINE | Facility: CLINIC | Age: 32
End: 2022-06-27
Payer: COMMERCIAL

## 2022-06-27 DIAGNOSIS — R59.9 LYMPH NODES ENLARGED: Primary | ICD-10-CM

## 2022-06-27 DIAGNOSIS — R59.0 LOCALIZED ENLARGED LYMPH NODES: ICD-10-CM

## 2022-06-27 NOTE — TELEPHONE ENCOUNTER
Spoke with pt- will send referral to [facility]    CT of chest in 3 months     ----- Message from Kiara Caballero LPN sent at 6/24/2022  9:47 AM CDT -----  Regarding: FW: Test Results  Spoke with pt- informed her she would be notified on Monday. Rl       ----- Message -----  From: Viniat Daugherty  Sent: 6/23/2022   1:56 PM CDT  To: [facility] Staff  Subject: Test Results                                     Type:  Test Results    Who Called: Patient  Name of Test (Lab/Mammo/Etc): [facility]  Date of Test: few weeks   Ordering Provider: Louis   Where the test was performed:   Would the patient rather a call back or a response via [facility]?   Best Call Back Number: [phone]  Additional Information:  Also the doctor she was referred to, she would like the name.

## 2022-07-13 ENCOUNTER — HOSPITAL ENCOUNTER (OUTPATIENT)
Dept: RADIOLOGY | Facility: HOSPITAL | Age: 32
Discharge: HOME | End: 2022-07-13
Attending: NURSE PRACTITIONER
Payer: COMMERCIAL

## 2022-07-13 DIAGNOSIS — R59.0 LOCALIZED ENLARGED LYMPH NODES: ICD-10-CM

## 2022-07-13 PROCEDURE — 71250 CT THORAX DX C-: CPT | Mod: TC

## 2022-07-20 DIAGNOSIS — R59.0 LOCALIZED ENLARGED LYMPH NODES: Primary | ICD-10-CM

## 2022-07-20 DIAGNOSIS — E04.9 ENLARGED THYROID: ICD-10-CM

## 2022-07-20 NOTE — PROGRESS NOTES
Spoke with pt( enlarged thyroid noted on CT  )     - ENT and surgeon referral faxes- [facility] [place] requests biopsy of enlarged lymph nodes to groin and axially to proceed

## 2022-07-20 NOTE — PROGRESS NOTES
Spoke with about CT.    enlarged thyroid noted    pt would lie k to proceed with ENT referral because mother has a hx of thyroid cancer.    recommend biopsy of thyroid.             also pt still c/o of  Pain to groin and neck lymp notes- will place Gen Surgeron referral

## 2022-08-15 ENCOUNTER — DOCUMENTATION ONLY (OUTPATIENT)
Dept: INTERNAL MEDICINE | Facility: CLINIC | Age: 32
End: 2022-08-15
Payer: COMMERCIAL

## 2022-08-22 ENCOUNTER — TELEPHONE (OUTPATIENT)
Dept: FAMILY MEDICINE | Facility: CLINIC | Age: 32
End: 2022-08-22
Payer: COMMERCIAL

## 2022-08-23 ENCOUNTER — TELEPHONE (OUTPATIENT)
Dept: FAMILY MEDICINE | Facility: CLINIC | Age: 32
End: 2022-08-23
Payer: COMMERCIAL

## 2022-08-23 NOTE — TELEPHONE ENCOUNTER
Spoke with Amie Fontanez at the cancer center, she stated that due to finding on US it may be better for the pt to see high risk breast surgeon and ENT for an enlarged thyroid.    Please advise

## 2022-08-24 NOTE — TELEPHONE ENCOUNTER
Looks like patient is currently being followed by ENT Dr. Eulogio Hardwick and he ordered the thyroid ultrasound. In Cherami's last note it mentions she is being followed by high risk breast clinic.  Patient had CT chest in July showing enlarged thyroid.  We also made a referral to heme/Onc as well as General surgery for biopsy.  Looks like patient canceled her mammogram on 08/01/2022.    Could we please call patient and get an update?   Thanks!

## 2022-10-19 ENCOUNTER — HOSPITAL ENCOUNTER (OUTPATIENT)
Dept: RADIOLOGY | Facility: HOSPITAL | Age: 32
Discharge: HOME | End: 2022-10-19
Attending: SURGERY
Payer: COMMERCIAL

## 2022-10-19 DIAGNOSIS — N64.4 MASTODYNIA: ICD-10-CM

## 2022-10-19 PROCEDURE — 77066 DX MAMMO INCL CAD BI: CPT | Mod: TC

## 2022-10-19 PROCEDURE — 76882 US LMTD JT/FCL EVL NVASC XTR: CPT | Mod: 26,RT,, | Performed by: RADIOLOGY

## 2022-10-19 PROCEDURE — 76882 US LMTD JT/FCL EVL NVASC XTR: CPT | Mod: TC,LT

## 2022-10-19 PROCEDURE — 76882 US AXILLA ONLY (BREAST IMAGING) RIGHT: ICD-10-PCS | Mod: 26,RT,, | Performed by: RADIOLOGY

## 2022-10-19 PROCEDURE — 76882 US LMTD JT/FCL EVL NVASC XTR: CPT | Mod: 26,LT,, | Performed by: RADIOLOGY

## 2022-10-19 PROCEDURE — 77062 BREAST TOMOSYNTHESIS BI: CPT | Mod: 26,,, | Performed by: RADIOLOGY

## 2022-10-19 PROCEDURE — 76882 US AXILLA ONLY (BREAST IMAGING) LEFT: ICD-10-PCS | Mod: 26,LT,, | Performed by: RADIOLOGY

## 2022-10-19 PROCEDURE — 77062 MAMMO DIGITAL DIAGNOSTIC BILAT WITH TOMO: ICD-10-PCS | Mod: 26,,, | Performed by: RADIOLOGY

## 2022-10-19 PROCEDURE — 76882 US LMTD JT/FCL EVL NVASC XTR: CPT | Mod: TC,RT

## 2022-10-19 PROCEDURE — 77066 MAMMO DIGITAL DIAGNOSTIC BILAT WITH TOMO: ICD-10-PCS | Mod: 26,,, | Performed by: RADIOLOGY

## 2022-10-19 PROCEDURE — 77066 DX MAMMO INCL CAD BI: CPT | Mod: 26,,, | Performed by: RADIOLOGY

## 2022-11-01 ENCOUNTER — DOCUMENTATION ONLY (OUTPATIENT)
Dept: FAMILY MEDICINE | Facility: CLINIC | Age: 32
End: 2022-11-01
Payer: COMMERCIAL

## 2022-11-21 DIAGNOSIS — E04.9 ENLARGED THYROID: ICD-10-CM

## 2022-11-21 DIAGNOSIS — E55.9 VITAMIN D DEFICIENCY: ICD-10-CM

## 2022-11-21 DIAGNOSIS — I10 HYPERTENSION, UNSPECIFIED TYPE: Primary | ICD-10-CM

## 2022-11-21 DIAGNOSIS — Z00.00 WELLNESS EXAMINATION: ICD-10-CM

## 2022-12-19 ENCOUNTER — OFFICE VISIT (OUTPATIENT)
Dept: SURGERY | Facility: CLINIC | Age: 32
End: 2022-12-19
Payer: COMMERCIAL

## 2022-12-19 VITALS
BODY MASS INDEX: 42.75 KG/M2 | RESPIRATION RATE: 18 BRPM | HEIGHT: 67 IN | DIASTOLIC BLOOD PRESSURE: 83 MMHG | HEART RATE: 85 BPM | TEMPERATURE: 99 F | OXYGEN SATURATION: 98 % | WEIGHT: 272.38 LBS | SYSTOLIC BLOOD PRESSURE: 128 MMHG

## 2022-12-19 DIAGNOSIS — Z91.89 AT HIGH RISK FOR BREAST CANCER: ICD-10-CM

## 2022-12-19 DIAGNOSIS — Z85.3 PERSONAL HISTORY OF BREAST CANCER: Primary | ICD-10-CM

## 2022-12-19 DIAGNOSIS — Z85.3: ICD-10-CM

## 2022-12-19 DIAGNOSIS — Z80.8 FAMILY HISTORY OF THYROID CANCER: ICD-10-CM

## 2022-12-19 PROCEDURE — 99215 PR OFFICE/OUTPT VISIT, EST, LEVL V, 40-54 MIN: ICD-10-PCS | Mod: S$GLB,,,

## 2022-12-19 PROCEDURE — 3074F PR MOST RECENT SYSTOLIC BLOOD PRESSURE < 130 MM HG: ICD-10-PCS | Mod: CPTII,S$GLB,,

## 2022-12-19 PROCEDURE — 4010F ACE/ARB THERAPY RXD/TAKEN: CPT | Mod: CPTII,S$GLB,,

## 2022-12-19 PROCEDURE — 3074F SYST BP LT 130 MM HG: CPT | Mod: CPTII,S$GLB,,

## 2022-12-19 PROCEDURE — 99417 PR PROLONGED SVC, OUTPT, W/WO DIRECT PT CONTACT,  EA ADDTL 15 MIN: ICD-10-PCS | Mod: S$GLB,,,

## 2022-12-19 PROCEDURE — 99215 OFFICE O/P EST HI 40 MIN: CPT | Mod: S$GLB,,,

## 2022-12-19 PROCEDURE — 3008F BODY MASS INDEX DOCD: CPT | Mod: CPTII,S$GLB,,

## 2022-12-19 PROCEDURE — 3079F DIAST BP 80-89 MM HG: CPT | Mod: CPTII,S$GLB,,

## 2022-12-19 PROCEDURE — 4010F PR ACE/ARB THEARPY RXD/TAKEN: ICD-10-PCS | Mod: CPTII,S$GLB,,

## 2022-12-19 PROCEDURE — 99999 PR PBB SHADOW E&M-EST. PATIENT-LVL IV: ICD-10-PCS | Mod: PBBFAC,,,

## 2022-12-19 PROCEDURE — 1159F MED LIST DOCD IN RCRD: CPT | Mod: CPTII,S$GLB,,

## 2022-12-19 PROCEDURE — 3079F PR MOST RECENT DIASTOLIC BLOOD PRESSURE 80-89 MM HG: ICD-10-PCS | Mod: CPTII,S$GLB,,

## 2022-12-19 PROCEDURE — 99999 PR PBB SHADOW E&M-EST. PATIENT-LVL IV: CPT | Mod: PBBFAC,,,

## 2022-12-19 PROCEDURE — 1159F PR MEDICATION LIST DOCUMENTED IN MEDICAL RECORD: ICD-10-PCS | Mod: CPTII,S$GLB,,

## 2022-12-19 PROCEDURE — 99417 PROLNG OP E/M EACH 15 MIN: CPT | Mod: S$GLB,,,

## 2022-12-19 PROCEDURE — 3008F PR BODY MASS INDEX (BMI) DOCUMENTED: ICD-10-PCS | Mod: CPTII,S$GLB,,

## 2022-12-19 RX ORDER — IBUPROFEN 800 MG/1
TABLET ORAL
COMMUNITY
End: 2022-12-21

## 2022-12-19 NOTE — PROGRESS NOTES
[facility] - [facility]  Breast Surgical Oncology  New Patient Office Visit - H&P      Care Team: Jose Lakhani- GYN     Chief Complaint:   Chief Complaint   Patient presents with    Breast Cancer Screening    Genetic Evaluation        Subjective:      Treatments:   July 6 2015 Genetics: Negative no clinically significant mutation identified. Additional Findings- variants of uncertain significance identified PMS2   2015 Patient had a Left Breast Malignant Phyllodes Tumor that was cared for at [facility]  No radiation or Medical oncology needed.   Recent history of a Right breast Biopsy that resulted with a 8:00 axis 3 cm FN mass  Pathology indicates benign fibroadenoma.  Pathology results are benign and concordant with mammography and ultrasound findings.      History of Present Illness:  Jazmine Welsh  is a pleasant female patient who initially presents on  12/19/2022 at 32 y.o. years old for evaluation and assessment of risk for breast cancer based a personal history of breast cancer diagnosed prior to age 48.     She currently denies any breast issues including rashes, redness, pain, swelling, nipple discharge, or new lumps/masses.    Patient has a extensive breast history of a left breast malignant Phyllodes tumor with liposarcomatous differentiation at age 24. She originally went in due to a left breast mass that was increasing in size and had associated pain. A US revealed a 3.1 x 3.5 x 5.3 cm well defined mass. Initial biopsy performed showed spindle cell lesion. Patient was then scheduled for excisional biopsy due to these findings and size of mass. Results from the excisional biopsy revealed a malignant phyllodes tumor with liposarcomatous differentiation measuring 9 cm. Due to close margin of less than 1 mm from the inked surface patient was scheduled for re-excision for 1 cm margin. Results from that then showed no evidence of malignancy. The patient was then presented at  tumor board and it was decided there was no need for radiation and no need for medical oncology to intervene. Of note, The Re-excision was done on 5/20/15 and a re-biopsy that showed no signs of malignancy. She was negative for metastatic disease as well. She had all this done at [facility].     Imaging:   10/19/2022 BL DG MG and BL Ax[place] [facility]  No mammographic evidence of malignancy is seen involving either breast.  No sonographic evidence of malignancy is seen involving either axilla. No suspicious mammographic or sonographic findings are seen in the bilateral axillae, in the areas of the patient's pain.  Benign, accessory breast tissue is noted in the bilateral axillae.In the absence of significant clinical findings in the interval, a routine screening mammogram in one year is recommended.Additionally, consideration of a yearly screening breast MRI (ideally 6 months following bilateral mammography) is recommended in this patient high-risk patient.       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 See HPI for details and scanned media documents.   2. 02/08/2021 ULTRASOUND GUIDED Right Breast BIOPSY [place] [facility]   Ultrasound guided biopsy of the right breast 8:00 axis 3 cm FN mass was successful with no apparent post procedure complications.  Pathology indicates benign fibroadenoma.  Pathology results are benign and concordant with mammography and ultrasound findings.      OB / GYN History   Menarche Onset:10  Menopause: premenapausal   Hormonal birth control (duration): 1 years  Pregnancies: 3  Age at first child birth: 18  Child births: 2  Hysterectomy/Oophorectomy: no  HRT: no    Family History of Cancer (& age at diagnosis):  -   Family History   Problem Relation Age of Onset    Thyroid cancer Mother         in her mid 30s    Dementia Maternal Grandmother     Thyroid cancer Maternal Grandfather         in his 60s    Lymphoma Maternal Uncle          mid 30s    Colon cancer Maternal Uncle         age unknown        Lifestyle:  Height and Weight: 5'7 , 272 lbs   BMI: Body mass index is 42.66 kg/m².     Other:  # of breast biopsies (when and pathology results): 3-  the two listed above and one at age 14 years old at [facility]. She is unsure of which breast. She thinks it was a fibroadenoma but unsure.   MG breast density: BIRADS C  Prior thoracic RT: none  Genetic testing: yes   Ashkenazi Orthodoxy descent: No    Other History:     Past Medical History:   Diagnosis Date    Anxiety disorder, unspecified     GERD (gastroesophageal reflux disease)     Malignant phyllodes tumor of breast, left         Past Surgical History:   Procedure Laterality Date    BREAST BIOPSY      BREAST SURGERY       SECTION          Social History     Socioeconomic History    Marital status:    Tobacco Use    Smoking status: Former     Types: Cigarettes    Smokeless tobacco: Never    Tobacco comments:     Quit smoking more than 5 yrs ago.          There is no immunization history on file for this patient.     Medications/Allergies:       Current Outpatient Medications:     ibuprofen (ADVIL,MOTRIN) 800 MG tablet, ibuprofen 800 mg tablet  TAKE 1 TABLET BY MOUTH EVERY 6 HOURS AS NEEDED FOR PAIN, Disp: , Rfl:     Review of patient's allergies indicates:  No Known Allergies     Review of Systems:      Constitutional: denies fevers, chills, weight loss  HEENT: denies blurry/double vision, changes in hearing, odynophagia, dysphagia  Respiratory: denies cough, shortness of breath  Cardiovascular: denies palpitations, swelling of the extremities  GI: denies abdominal pain, nausea/vomiting, hematochezia, frequent stools  : denies frequency, dysuria, flank pain, hematuria  Skin: denies new rashes  Neurological: denies muscular/sensory deficiencies, loss of coordination, headaches, memory changes  Endo: denies hair loss/thinning, nervousness, hot flashes, heat/cold  intolerance, lumps in the neck area  Heme: denies easy bruising and fatigue  Psychological: denies anxious/depressive moods  Musculoskeletal: denies bony pain, muscle cramps, swollen joints       Objective/Physical Exam     Vitals:    12/19/22 1411   BP: 128/83   Pulse: 85   Resp: 18   Temp: 98.6 °F (37 °C)        General: The patient is awake, alert and oriented times three. The patient is well nourished and in no acute distress.  Neck: There is no evidence of palpable cervical, supraclavicular or axillary adenopathy. The neck is supple. The thyroid is not enlarged.  Musculoskeletal: The patient has a normal range of motion of her bilateral upper extremities.  Chest: Examination of the chest wall fails to reveal any obvious abnormalities. Nonlabored breathing, symmetric expansion.  Breast:  Right: Examination of right breast fails to reveal any dominant masses or areas of significant focal nodularity. The nipple is everted without evidence of discharge. There is no skin dimpling with movement of the pectoralis. There are no significant skin changes overlying the breast.   Left: Examination of the left breast fails to reveal any dominant masses or areas of significant focal nodularity. The nipple is everted without evidence of discharge. There is no skin dimpling with movement of the pectoralis. There are no significant skin changes overlying the breast.  Abdomen: The abdomen is soft, flat, nontender and nondistended.  Integumentary: no rashes or skin lesions present  Neurologic: cranial nerves intact, no signs of peripheral neurological deficit, motor/sensory function intact     Assessment and Plan     There is no problem list on file for this patient.      Jazmine was seen today for breast cancer screening and genetic evaluation.    Diagnoses and all orders for this visit:    Personal history of breast cancer  -     MRI Breast w/wo Contrast, w/CAD, Bilateral; Future  -     Mammo Digital Screening Bilat w/ Patrick;  Future    History of malignant phyllodes tumor of breast  -     MRI Breast w/wo Contrast, w/CAD, Bilateral; Future  -     Mammo Digital Screening Bilat w/ Patrick; Future    Family history of thyroid cancer    At high risk for breast cancer  -     MRI Breast w/wo Contrast, w/CAD, Bilateral; Future  -     Mammo Digital Screening Bilat w/ Patrick; Future           --------------------------------------------------------------------------------------------------------------  After the initial clinical evaluation nearly 30 minutes were on counseling the patients regarding the options for management. Risk reduction strategies were discussed.     1. Lifestyle factors: As with other types of cancer, studies continue to show that various lifestyle factors may contribute to the development of breast cancer.     Weight: Recent studies have shown that postmenopausal women who are overweight or obese have an increased risk of breast cancer. These women also have a higher risk of having the cancer come back after treatment.     Physical activity: Decreased physical activity is associated with an increased risk of developing breast cancer and a higher risk of having the cancer come back after treatment. Regular physical activity may protect against breast cancer by helping women maintain a healthy body weight, lowering hormone levels, or causing changes in a womens metabolism or immune factors.     Alcohol: Current research suggests that having more than 1 to 2 alcoholic drinks, including beer, wine, and spirits, per day raises the risk of breast cancer, as well as the risk of having the cancer come back after treatment.     Food: There is no reliable research that confirms that eating or avoiding specific foods reduces the risk of developing breast cancer or having the cancer come back after treatment. However, eating more fruits and vegetables and fewer animal fats is linked with many health benefits.     2. Prevention:  Surgery to  "lower cancer risk: For women with BRCA1 or BRCA2 genetic mutations, which substantially increase the risk of breast cancer, preventive removal of the breasts may be considered. The procedure, called a prophylactic mastectomy, appears to reduce the risk of developing breast cancer by at least 95%. Women with these mutations should also consider the preventive removal of the ovaries and fallopian tubes, called a prophylactic salpingo-oophorectomy. This procedure can reduce the risk of developing ovarian cancer, as well as breast cancer, by stopping the ovaries from making estrogen.      Drugs to lower cancer risk (Chemoprevention): Women who have a higher than usual risk of developing breast cancer may consider certain drugs that may help prevent breast cancer. This approach may also be called "chemoprevention." For breast cancer, this is the use of hormone-blocking drugs to reduce cancer risk. The drugs, tamoxifen (Soltamox) and raloxifene (Evista), are approved by the U.S. Food and Drug Administration (FDA) to lower breast cancer risk. These drugs are called selective estrogen receptor modulators (SERMs) and are not chemotherapy. A SERM is a medication that blocks estrogen receptors in some tissues and not others. Both women who have gone through menopause and those who have not may take tamoxifen. Raloxifene is only approved for women who have gone through menopause. Each drug also has different side effects.     Aromatase inhibitors (AIs) have also been shown to lower breast cancer risk. AIs are a type of hormone-blocking treatment that reduces the amount of estrogen in a woman's body by stopping tissues and organs other than the ovaries from producing estrogen. They can only be used by women who have gone through menopause. However, no AIs have been approved by the FDA for lowering breast cancer risk in women who do not have the disease.     3. Surveillance: Women at greater than 20 percent average lifetime risk " of invasive breast cancer based mainly on family history     Clinical Breast exam: Every 6-12 months starting at age found to be at increased risk by risk model     Mammogram: Every year starting 10 years younger than the youngest breast cancer case in the family (but not before age 30)     Breast MRI: Every year starting 10 years younger than the youngest breast cancer case in the family (but not before age 25). If you have a first-degree relative with a BRCA1/2 gene mutation, youre encouraged to get genetic counseling and/or testing before getting MRI as part of screening (for those who do not wish to have genetic testing, MRI is recommended). Breast MRI in combination with mammography is better than mammography alone at finding breast cancer in certain women at higher than average risk.    --------------------------------------------------------------------------------------------------------------      PLAN:    1. Lifestyle - Healthy lifestyle guidelines were reviewed. She was encouraged to engage in regular exercise, maintain a healthy body weight, and avoid excessive alcohol consumption. Healthy nutritional guidelines were also discussed. Self-breast examination was reviewed with the patient in detail and she was encouraged to perform this on a monthly basis.    2. Surveillance - She desires undergoing high risk screening with annual screening mammograms and breast MRIs. In the absence of significant clinical findings in the interval, I recommend a high risk screening MRI in April. A SCR MG in October. Recommend annual supplemental breast screening with dynamic contrast enhanced breast MRI in this patient with a personal history of breast cancer diagnosed prior to age 50 and heterogeneously dense breast tissue.     3. Prevention - We had a brief discussion/education about indications for preventative mastectomy or chemoprevention.  These methods are not recommended to her at this time. She is of child bearing  age.     4. Genetics - This was done in 2015. Negative no clinically significant mutation identified. Additional Findings- variants of uncertain significance identified PMS2     5. Continue to see OB/GYN for CBE. Recommend two CBE a year. One with us and one with your OB/GYN Provider. We recommend them to be at a six month interval.  She sees her OB/GYN tomorrow.     6. RTC with YOMAIRA Jasso in 4 to 6 weeks to discuss preventative mastectomy. Patient is interested in this given her history of cancer at such a young age as well as having multiple breast biopsies.     7. Please call our office with any questions or concerns that may arise before the next appointment.       All of her questions were answered.     JOSETTE Sood      ------------------------------------------------------------------------------------------------------------  Total time on the date of the visit ranged from 60-74 mins (95547). Total time includes both face-to-face and non-face-to-face time personally spent by myself on the day of the visit.    Non-face-to-face time included:  _X_ preparing to see the patient such as reviewing the patient record  _X_ obtaining and reviewing separately obtained history  _X_ independently interpreting results  _X_ documenting clinical information in electronic health record.    Face-to-face time included:  _X_ performing an appropriate history and examination  _X_ communicating results to the patient  _X_ counseling and educating the patient  __ ordering appropriate medications  _x_ ordering appropriate tests  _X_ ordering appropriate procedures (including follow-up)  _X_ answering any questions the patient had    Total Time spent on date of visit: 60 minutes

## 2022-12-21 ENCOUNTER — HOSPITAL ENCOUNTER (OUTPATIENT)
Dept: RADIOLOGY | Facility: HOSPITAL | Age: 32
Discharge: HOME | End: 2022-12-21
Attending: STUDENT IN AN ORGANIZED HEALTH CARE EDUCATION/TRAINING PROGRAM
Payer: COMMERCIAL

## 2022-12-21 ENCOUNTER — OFFICE VISIT (OUTPATIENT)
Dept: FAMILY MEDICINE | Facility: CLINIC | Age: 32
End: 2022-12-21
Payer: COMMERCIAL

## 2022-12-21 VITALS
SYSTOLIC BLOOD PRESSURE: 120 MMHG | HEART RATE: 78 BPM | WEIGHT: 271 LBS | TEMPERATURE: 99 F | OXYGEN SATURATION: 96 % | RESPIRATION RATE: 20 BRPM | BODY MASS INDEX: 42.53 KG/M2 | HEIGHT: 67 IN | DIASTOLIC BLOOD PRESSURE: 86 MMHG

## 2022-12-21 DIAGNOSIS — G89.29 CHRONIC MIDLINE LOW BACK PAIN, UNSPECIFIED WHETHER SCIATICA PRESENT: ICD-10-CM

## 2022-12-21 DIAGNOSIS — M54.50 CHRONIC MIDLINE LOW BACK PAIN, UNSPECIFIED WHETHER SCIATICA PRESENT: ICD-10-CM

## 2022-12-21 DIAGNOSIS — M54.41 CHRONIC BILATERAL LOW BACK PAIN WITH BILATERAL SCIATICA: ICD-10-CM

## 2022-12-21 DIAGNOSIS — E04.9 ENLARGED THYROID: ICD-10-CM

## 2022-12-21 DIAGNOSIS — Z00.00 WELLNESS EXAMINATION: Primary | ICD-10-CM

## 2022-12-21 DIAGNOSIS — G89.29 CHRONIC BILATERAL LOW BACK PAIN WITH BILATERAL SCIATICA: ICD-10-CM

## 2022-12-21 DIAGNOSIS — M54.42 CHRONIC BILATERAL LOW BACK PAIN WITH BILATERAL SCIATICA: ICD-10-CM

## 2022-12-21 DIAGNOSIS — I47.10 SVT (SUPRAVENTRICULAR TACHYCARDIA): ICD-10-CM

## 2022-12-21 LAB
ALBUMIN SERPL-MCNC: 3.9 G/DL (ref 3.5–5)
ALBUMIN/GLOB SERPL: 1 RATIO (ref 1.1–2)
ALP SERPL-CCNC: 70 UNIT/L (ref 40–150)
ALT SERPL-CCNC: 11 UNIT/L (ref 0–55)
APPEARANCE UR: CLEAR
AST SERPL-CCNC: 15 UNIT/L (ref 5–34)
BACTERIA #/AREA URNS AUTO: ABNORMAL /HPF
BASOPHILS # BLD AUTO: 0.03 X10(3)/MCL (ref 0–0.2)
BASOPHILS NFR BLD AUTO: 0.6 %
BILIRUB UR QL STRIP.AUTO: NEGATIVE MG/DL
BILIRUBIN DIRECT+TOT PNL SERPL-MCNC: 0.5 MG/DL
BUN SERPL-MCNC: 10.6 MG/DL (ref 7–18.7)
CALCIUM SERPL-MCNC: 9.6 MG/DL (ref 8.4–10.2)
CHLORIDE SERPL-SCNC: 107 MMOL/L (ref 98–107)
CHOLEST SERPL-MCNC: 166 MG/DL
CHOLEST/HDLC SERPL: 3 {RATIO} (ref 0–5)
CO2 SERPL-SCNC: 24 MMOL/L (ref 22–29)
COLOR UR AUTO: ABNORMAL
CREAT SERPL-MCNC: 0.81 MG/DL (ref 0.55–1.02)
EOSINOPHIL # BLD AUTO: 0.09 X10(3)/MCL (ref 0–0.9)
EOSINOPHIL NFR BLD AUTO: 1.7 %
ERYTHROCYTE [DISTWIDTH] IN BLOOD BY AUTOMATED COUNT: 13.2 % (ref 11–14.5)
EST. AVERAGE GLUCOSE BLD GHB EST-MCNC: 93.9 MG/DL
GFR SERPLBLD CREATININE-BSD FMLA CKD-EPI: >60 MLS/MIN/1.73/M2
GLOBULIN SER-MCNC: 4.1 GM/DL (ref 2.4–3.5)
GLUCOSE SERPL-MCNC: 88 MG/DL (ref 74–100)
GLUCOSE UR QL STRIP.AUTO: NORMAL MG/DL
HBA1C MFR BLD: 4.9 %
HCT VFR BLD AUTO: 40.7 % (ref 37–47)
HDLC SERPL-MCNC: 49 MG/DL (ref 35–60)
HGB BLD-MCNC: 12.8 GM/DL (ref 12–16)
HYALINE CASTS #/AREA URNS LPF: ABNORMAL /LPF
IMM GRANULOCYTES # BLD AUTO: 0.01 X10(3)/MCL (ref 0–0.04)
IMM GRANULOCYTES NFR BLD AUTO: 0.2 %
KETONES UR QL STRIP.AUTO: NEGATIVE MG/DL
LDLC SERPL CALC-MCNC: 105 MG/DL (ref 50–140)
LEUKOCYTE ESTERASE UR QL STRIP.AUTO: NEGATIVE UNIT/L
LYMPHOCYTES # BLD AUTO: 2.15 X10(3)/MCL (ref 0.6–4.6)
LYMPHOCYTES NFR BLD AUTO: 40.8 %
MCH RBC QN AUTO: 27.7 PG
MCHC RBC AUTO-ENTMCNC: 31.4 MG/DL (ref 33–36)
MCV RBC AUTO: 88.1 FL (ref 80–94)
MONOCYTES # BLD AUTO: 0.44 X10(3)/MCL (ref 0.1–1.3)
MONOCYTES NFR BLD AUTO: 8.3 %
MUCOUS THREADS URNS QL MICRO: ABNORMAL /LPF
NEUTROPHILS # BLD AUTO: 2.55 X10(3)/MCL (ref 2.1–9.2)
NEUTROPHILS NFR BLD AUTO: 48.4 %
NITRITE UR QL STRIP.AUTO: NEGATIVE
NRBC BLD AUTO-RTO: 0 % (ref 0–1)
PH UR STRIP.AUTO: 6.5 [PH]
PLATELET # BLD AUTO: 291 X10(3)/MCL (ref 140–371)
PMV BLD AUTO: 10.4 FL (ref 9.4–12.4)
POTASSIUM SERPL-SCNC: 4.4 MMOL/L (ref 3.5–5.1)
PROT SERPL-MCNC: 8 GM/DL (ref 6.4–8.3)
PROT UR QL STRIP.AUTO: NEGATIVE MG/DL
RBC # BLD AUTO: 4.62 X10(6)/MCL (ref 4.2–5.4)
RBC #/AREA URNS AUTO: ABNORMAL /HPF
RBC UR QL AUTO: NEGATIVE UNIT/L
SODIUM SERPL-SCNC: 138 MMOL/L (ref 136–145)
SP GR UR STRIP.AUTO: 1.02
SQUAMOUS #/AREA URNS LPF: ABNORMAL /HPF
T4 FREE SERPL-MCNC: 0.85 NG/DL (ref 0.7–1.48)
TRIGL SERPL-MCNC: 58 MG/DL (ref 37–140)
TSH SERPL-ACNC: 0.66 UIU/ML (ref 0.35–4.94)
UROBILINOGEN UR STRIP-ACNC: NORMAL MG/DL
VLDLC SERPL CALC-MCNC: 12 MG/DL
WBC # SPEC AUTO: 5.3 X10(3)/MCL (ref 4.5–11.5)
WBC #/AREA URNS AUTO: ABNORMAL /HPF

## 2022-12-21 PROCEDURE — 3079F DIAST BP 80-89 MM HG: CPT | Mod: CPTII,,, | Performed by: STUDENT IN AN ORGANIZED HEALTH CARE EDUCATION/TRAINING PROGRAM

## 2022-12-21 PROCEDURE — 3008F BODY MASS INDEX DOCD: CPT | Mod: CPTII,,, | Performed by: STUDENT IN AN ORGANIZED HEALTH CARE EDUCATION/TRAINING PROGRAM

## 2022-12-21 PROCEDURE — 3074F SYST BP LT 130 MM HG: CPT | Mod: CPTII,,, | Performed by: STUDENT IN AN ORGANIZED HEALTH CARE EDUCATION/TRAINING PROGRAM

## 2022-12-21 PROCEDURE — 36415 COLL VENOUS BLD VENIPUNCTURE: CPT | Performed by: STUDENT IN AN ORGANIZED HEALTH CARE EDUCATION/TRAINING PROGRAM

## 2022-12-21 PROCEDURE — 72220 X-RAY EXAM SACRUM TAILBONE: CPT | Mod: TC,PN

## 2022-12-21 PROCEDURE — 99395 PR PREVENTIVE VISIT,EST,18-39: ICD-10-PCS | Mod: S$PBB,,, | Performed by: STUDENT IN AN ORGANIZED HEALTH CARE EDUCATION/TRAINING PROGRAM

## 2022-12-21 PROCEDURE — 84439 ASSAY OF FREE THYROXINE: CPT | Performed by: STUDENT IN AN ORGANIZED HEALTH CARE EDUCATION/TRAINING PROGRAM

## 2022-12-21 PROCEDURE — 99214 OFFICE O/P EST MOD 30 MIN: CPT | Mod: S$PBB,25,, | Performed by: STUDENT IN AN ORGANIZED HEALTH CARE EDUCATION/TRAINING PROGRAM

## 2022-12-21 PROCEDURE — 1159F PR MEDICATION LIST DOCUMENTED IN MEDICAL RECORD: ICD-10-PCS | Mod: CPTII,,, | Performed by: STUDENT IN AN ORGANIZED HEALTH CARE EDUCATION/TRAINING PROGRAM

## 2022-12-21 PROCEDURE — 83036 HEMOGLOBIN GLYCOSYLATED A1C: CPT | Performed by: STUDENT IN AN ORGANIZED HEALTH CARE EDUCATION/TRAINING PROGRAM

## 2022-12-21 PROCEDURE — 99215 OFFICE O/P EST HI 40 MIN: CPT | Mod: PBBFAC,PN | Performed by: STUDENT IN AN ORGANIZED HEALTH CARE EDUCATION/TRAINING PROGRAM

## 2022-12-21 PROCEDURE — 85025 COMPLETE CBC W/AUTO DIFF WBC: CPT | Performed by: STUDENT IN AN ORGANIZED HEALTH CARE EDUCATION/TRAINING PROGRAM

## 2022-12-21 PROCEDURE — 3008F PR BODY MASS INDEX (BMI) DOCUMENTED: ICD-10-PCS | Mod: CPTII,,, | Performed by: STUDENT IN AN ORGANIZED HEALTH CARE EDUCATION/TRAINING PROGRAM

## 2022-12-21 PROCEDURE — 99395 PREV VISIT EST AGE 18-39: CPT | Mod: S$PBB,,, | Performed by: STUDENT IN AN ORGANIZED HEALTH CARE EDUCATION/TRAINING PROGRAM

## 2022-12-21 PROCEDURE — 84443 ASSAY THYROID STIM HORMONE: CPT | Performed by: STUDENT IN AN ORGANIZED HEALTH CARE EDUCATION/TRAINING PROGRAM

## 2022-12-21 PROCEDURE — 4010F ACE/ARB THERAPY RXD/TAKEN: CPT | Mod: CPTII,,, | Performed by: STUDENT IN AN ORGANIZED HEALTH CARE EDUCATION/TRAINING PROGRAM

## 2022-12-21 PROCEDURE — 72100 X-RAY EXAM L-S SPINE 2/3 VWS: CPT | Mod: TC,PN

## 2022-12-21 PROCEDURE — 1159F MED LIST DOCD IN RCRD: CPT | Mod: CPTII,,, | Performed by: STUDENT IN AN ORGANIZED HEALTH CARE EDUCATION/TRAINING PROGRAM

## 2022-12-21 PROCEDURE — 99214 PR OFFICE/OUTPT VISIT, EST, LEVL IV, 30-39 MIN: ICD-10-PCS | Mod: S$PBB,25,, | Performed by: STUDENT IN AN ORGANIZED HEALTH CARE EDUCATION/TRAINING PROGRAM

## 2022-12-21 PROCEDURE — 4010F PR ACE/ARB THEARPY RXD/TAKEN: ICD-10-PCS | Mod: CPTII,,, | Performed by: STUDENT IN AN ORGANIZED HEALTH CARE EDUCATION/TRAINING PROGRAM

## 2022-12-21 PROCEDURE — 81001 URINALYSIS AUTO W/SCOPE: CPT | Performed by: STUDENT IN AN ORGANIZED HEALTH CARE EDUCATION/TRAINING PROGRAM

## 2022-12-21 PROCEDURE — 3079F PR MOST RECENT DIASTOLIC BLOOD PRESSURE 80-89 MM HG: ICD-10-PCS | Mod: CPTII,,, | Performed by: STUDENT IN AN ORGANIZED HEALTH CARE EDUCATION/TRAINING PROGRAM

## 2022-12-21 PROCEDURE — 3074F PR MOST RECENT SYSTOLIC BLOOD PRESSURE < 130 MM HG: ICD-10-PCS | Mod: CPTII,,, | Performed by: STUDENT IN AN ORGANIZED HEALTH CARE EDUCATION/TRAINING PROGRAM

## 2022-12-21 PROCEDURE — 80061 LIPID PANEL: CPT | Performed by: STUDENT IN AN ORGANIZED HEALTH CARE EDUCATION/TRAINING PROGRAM

## 2022-12-21 PROCEDURE — 80053 COMPREHEN METABOLIC PANEL: CPT | Performed by: STUDENT IN AN ORGANIZED HEALTH CARE EDUCATION/TRAINING PROGRAM

## 2022-12-21 RX ORDER — NAPROXEN 500 MG/1
500 TABLET ORAL 2 TIMES DAILY WITH MEALS
Qty: 60 TABLET | Refills: 6 | Status: SHIPPED | OUTPATIENT
Start: 2022-12-21 | End: 2023-08-11
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
## 2022-12-22 ENCOUNTER — TELEPHONE (OUTPATIENT)
Dept: FAMILY MEDICINE | Facility: CLINIC | Age: 32
End: 2022-12-22

## 2022-12-22 ENCOUNTER — PATIENT MESSAGE (OUTPATIENT)
Dept: FAMILY MEDICINE | Facility: CLINIC | Age: 32
End: 2022-12-22
Payer: COMMERCIAL

## 2023-01-12 ENCOUNTER — OFFICE VISIT (OUTPATIENT)
Dept: FAMILY MEDICINE | Facility: CLINIC | Age: 33
End: 2023-01-12
Payer: COMMERCIAL

## 2023-01-12 DIAGNOSIS — I47.10 SVT (SUPRAVENTRICULAR TACHYCARDIA): ICD-10-CM

## 2023-01-12 DIAGNOSIS — R45.89 ANXIETY ABOUT HEALTH: ICD-10-CM

## 2023-01-12 DIAGNOSIS — R60.1 GENERALIZED EDEMA: ICD-10-CM

## 2023-01-12 DIAGNOSIS — M79.672 LEFT FOOT PAIN: ICD-10-CM

## 2023-01-12 DIAGNOSIS — M79.672 FOOT PAIN, LEFT: Primary | ICD-10-CM

## 2023-01-12 DIAGNOSIS — R71.8 HIGH MEAN CORPUSCULAR HEMOGLOBIN CONCENTRATION (MCHC): ICD-10-CM

## 2023-01-12 PROCEDURE — 99214 OFFICE O/P EST MOD 30 MIN: CPT | Mod: 95,,, | Performed by: STUDENT IN AN ORGANIZED HEALTH CARE EDUCATION/TRAINING PROGRAM

## 2023-01-12 PROCEDURE — 99214 PR OFFICE/OUTPT VISIT, EST, LEVL IV, 30-39 MIN: ICD-10-PCS | Mod: 95,,, | Performed by: STUDENT IN AN ORGANIZED HEALTH CARE EDUCATION/TRAINING PROGRAM
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
## 2023-01-13 PROBLEM — R60.1 GENERALIZED EDEMA: Status: ACTIVE | Noted: 2023-01-13

## 2023-01-13 PROBLEM — R45.89 ANXIETY ABOUT HEALTH: Status: ACTIVE | Noted: 2023-01-13

## 2023-01-13 PROBLEM — F41.8 ANXIETY ABOUT HEALTH: Status: ACTIVE | Noted: 2023-01-13

## 2023-01-13 PROBLEM — M79.672 LEFT FOOT PAIN: Status: ACTIVE | Noted: 2023-01-13

## 2023-01-17 ENCOUNTER — PATIENT MESSAGE (OUTPATIENT)
Dept: FAMILY MEDICINE | Facility: CLINIC | Age: 33
End: 2023-01-17
Payer: COMMERCIAL

## 2023-01-24 ENCOUNTER — HOSPITAL ENCOUNTER (OUTPATIENT)
Dept: RADIOLOGY | Facility: HOSPITAL | Age: 33
Discharge: HOME | End: 2023-01-24
Attending: STUDENT IN AN ORGANIZED HEALTH CARE EDUCATION/TRAINING PROGRAM
Payer: COMMERCIAL

## 2023-01-24 ENCOUNTER — PATIENT MESSAGE (OUTPATIENT)
Dept: FAMILY MEDICINE | Facility: CLINIC | Age: 33
End: 2023-01-24
Payer: COMMERCIAL

## 2023-01-24 ENCOUNTER — CLINICAL SUPPORT (OUTPATIENT)
Dept: FAMILY MEDICINE | Facility: CLINIC | Age: 33
End: 2023-01-24
Payer: COMMERCIAL

## 2023-01-24 DIAGNOSIS — R71.8 HIGH MEAN CORPUSCULAR HEMOGLOBIN CONCENTRATION (MCHC): ICD-10-CM

## 2023-01-24 DIAGNOSIS — M79.672 FOOT PAIN, LEFT: Primary | ICD-10-CM

## 2023-01-24 DIAGNOSIS — M79.672 FOOT PAIN, LEFT: ICD-10-CM

## 2023-01-24 LAB
DEPRECATED CALCIDIOL+CALCIFEROL SERPL-MC: 13.6 NG/ML (ref 30–80)
FOLATE SERPL-MCNC: 8.7 NG/ML (ref 7–31.4)
IRON SATN MFR SERPL: 18 % (ref 20–50)
IRON SERPL-MCNC: 65 UG/DL (ref 50–170)
TIBC SERPL-MCNC: 290 UG/DL (ref 70–310)
TIBC SERPL-MCNC: 355 UG/DL (ref 250–450)
TRANSFERRIN SERPL-MCNC: 303 MG/DL (ref 180–382)
VIT B12 SERPL-MCNC: 428 PG/ML (ref 213–816)

## 2023-01-24 PROCEDURE — 36415 COLL VENOUS BLD VENIPUNCTURE: CPT

## 2023-01-24 PROCEDURE — 73630 X-RAY EXAM OF FOOT: CPT | Mod: TC,PN,LT

## 2023-01-24 PROCEDURE — 82746 ASSAY OF FOLIC ACID SERUM: CPT

## 2023-01-24 PROCEDURE — 83550 IRON BINDING TEST: CPT

## 2023-01-24 PROCEDURE — 99211 OFF/OP EST MAY X REQ PHY/QHP: CPT | Mod: PBBFAC,25,PN

## 2023-01-24 PROCEDURE — 82607 VITAMIN B-12: CPT

## 2023-01-24 PROCEDURE — 82306 VITAMIN D 25 HYDROXY: CPT | Performed by: STUDENT IN AN ORGANIZED HEALTH CARE EDUCATION/TRAINING PROGRAM

## 2023-01-26 DIAGNOSIS — R79.89 LOW VITAMIN D LEVEL: Primary | ICD-10-CM

## 2023-01-26 RX ORDER — ASPIRIN 325 MG
50000 TABLET, DELAYED RELEASE (ENTERIC COATED) ORAL
Qty: 12 CAPSULE | Refills: 0 | Status: SHIPPED | OUTPATIENT
Start: 2023-01-26 | End: 2023-04-14

## 2023-02-22 ENCOUNTER — TELEPHONE (OUTPATIENT)
Dept: FAMILY MEDICINE | Facility: CLINIC | Age: 33
End: 2023-02-22
Payer: COMMERCIAL

## 2023-02-24 ENCOUNTER — TELEPHONE (OUTPATIENT)
Dept: FAMILY MEDICINE | Facility: CLINIC | Age: 33
End: 2023-02-24
Payer: COMMERCIAL

## 2023-02-24 NOTE — TELEPHONE ENCOUNTER
Returned call to patient numerous time unable to contact patient ,ask the front to send out a letter of contact to patient.

## 2023-05-22 ENCOUNTER — OFFICE VISIT (OUTPATIENT)
Dept: FAMILY MEDICINE | Facility: CLINIC | Age: 33
End: 2023-05-22
Payer: COMMERCIAL

## 2023-05-22 VITALS
WEIGHT: 266.19 LBS | OXYGEN SATURATION: 100 % | TEMPERATURE: 99 F | HEIGHT: 67 IN | BODY MASS INDEX: 41.78 KG/M2 | SYSTOLIC BLOOD PRESSURE: 135 MMHG | HEART RATE: 100 BPM | DIASTOLIC BLOOD PRESSURE: 84 MMHG | RESPIRATION RATE: 20 BRPM

## 2023-05-22 DIAGNOSIS — I47.10 SVT (SUPRAVENTRICULAR TACHYCARDIA): ICD-10-CM

## 2023-05-22 DIAGNOSIS — E04.9 ENLARGED THYROID: Primary | ICD-10-CM

## 2023-05-22 DIAGNOSIS — R00.2 PALPITATIONS: Primary | ICD-10-CM

## 2023-05-22 DIAGNOSIS — R00.2 PALPITATIONS: ICD-10-CM

## 2023-05-22 LAB
ALBUMIN SERPL-MCNC: 4.3 G/DL (ref 3.5–5)
ALBUMIN/GLOB SERPL: 1.1 RATIO (ref 1.1–2)
ALP SERPL-CCNC: 64 UNIT/L (ref 40–150)
ALT SERPL-CCNC: 18 UNIT/L (ref 0–55)
AST SERPL-CCNC: 17 UNIT/L (ref 5–34)
BASOPHILS # BLD AUTO: 0.03 X10(3)/MCL
BASOPHILS NFR BLD AUTO: 0.6 %
BILIRUBIN DIRECT+TOT PNL SERPL-MCNC: 0.4 MG/DL
BUN SERPL-MCNC: 8.8 MG/DL (ref 7–18.7)
CALCIUM SERPL-MCNC: 9.8 MG/DL (ref 8.4–10.2)
CHLORIDE SERPL-SCNC: 108 MMOL/L (ref 98–107)
CO2 SERPL-SCNC: 22 MMOL/L (ref 22–29)
CREAT SERPL-MCNC: 0.86 MG/DL (ref 0.55–1.02)
EOSINOPHIL # BLD AUTO: 0.07 X10(3)/MCL (ref 0–0.9)
EOSINOPHIL NFR BLD AUTO: 1.3 %
ERYTHROCYTE [DISTWIDTH] IN BLOOD BY AUTOMATED COUNT: 13.2 % (ref 11.5–17)
GFR SERPLBLD CREATININE-BSD FMLA CKD-EPI: >60 MLS/MIN/1.73/M2
GLOBULIN SER-MCNC: 4 GM/DL (ref 2.4–3.5)
GLUCOSE SERPL-MCNC: 76 MG/DL (ref 74–100)
HCT VFR BLD AUTO: 40.7 % (ref 37–47)
HGB BLD-MCNC: 12.7 G/DL (ref 12–16)
IMM GRANULOCYTES # BLD AUTO: 0.01 X10(3)/MCL (ref 0–0.04)
IMM GRANULOCYTES NFR BLD AUTO: 0.2 %
LYMPHOCYTES # BLD AUTO: 2.37 X10(3)/MCL (ref 0.6–4.6)
LYMPHOCYTES NFR BLD AUTO: 45.4 %
MCH RBC QN AUTO: 27.5 PG (ref 27–31)
MCHC RBC AUTO-ENTMCNC: 31.2 G/DL (ref 33–36)
MCV RBC AUTO: 88.1 FL (ref 80–94)
MONOCYTES # BLD AUTO: 0.3 X10(3)/MCL (ref 0.1–1.3)
MONOCYTES NFR BLD AUTO: 5.7 %
NEUTROPHILS # BLD AUTO: 2.44 X10(3)/MCL (ref 2.1–9.2)
NEUTROPHILS NFR BLD AUTO: 46.8 %
NRBC BLD AUTO-RTO: 0 %
PLATELET # BLD AUTO: 296 X10(3)/MCL (ref 130–400)
PLATELETS.RETICULATED NFR BLD AUTO: 6.1 % (ref 0.9–11.2)
PMV BLD AUTO: 10.8 FL (ref 7.4–10.4)
POTASSIUM SERPL-SCNC: 3.9 MMOL/L (ref 3.5–5.1)
PROT SERPL-MCNC: 8.3 GM/DL (ref 6.4–8.3)
RBC # BLD AUTO: 4.62 X10(6)/MCL (ref 4.2–5.4)
SODIUM SERPL-SCNC: 138 MMOL/L (ref 136–145)
T3FREE SERPL-MCNC: 2.7 PG/ML (ref 1.57–3.91)
TSH SERPL-ACNC: 0.37 UIU/ML (ref 0.35–4.94)
WBC # SPEC AUTO: 5.22 X10(3)/MCL (ref 4.5–11.5)

## 2023-05-22 PROCEDURE — 84481 FREE ASSAY (FT-3): CPT | Performed by: STUDENT IN AN ORGANIZED HEALTH CARE EDUCATION/TRAINING PROGRAM

## 2023-05-22 PROCEDURE — 3079F PR MOST RECENT DIASTOLIC BLOOD PRESSURE 80-89 MM HG: ICD-10-PCS | Mod: CPTII,,, | Performed by: STUDENT IN AN ORGANIZED HEALTH CARE EDUCATION/TRAINING PROGRAM

## 2023-05-22 PROCEDURE — 3008F PR BODY MASS INDEX (BMI) DOCUMENTED: ICD-10-PCS | Mod: CPTII,,, | Performed by: STUDENT IN AN ORGANIZED HEALTH CARE EDUCATION/TRAINING PROGRAM

## 2023-05-22 PROCEDURE — 85025 COMPLETE CBC W/AUTO DIFF WBC: CPT | Performed by: STUDENT IN AN ORGANIZED HEALTH CARE EDUCATION/TRAINING PROGRAM

## 2023-05-22 PROCEDURE — 36415 COLL VENOUS BLD VENIPUNCTURE: CPT | Performed by: STUDENT IN AN ORGANIZED HEALTH CARE EDUCATION/TRAINING PROGRAM

## 2023-05-22 PROCEDURE — 3008F BODY MASS INDEX DOCD: CPT | Mod: CPTII,,, | Performed by: STUDENT IN AN ORGANIZED HEALTH CARE EDUCATION/TRAINING PROGRAM

## 2023-05-22 PROCEDURE — 3075F SYST BP GE 130 - 139MM HG: CPT | Mod: CPTII,,, | Performed by: STUDENT IN AN ORGANIZED HEALTH CARE EDUCATION/TRAINING PROGRAM

## 2023-05-22 PROCEDURE — 3075F PR MOST RECENT SYSTOLIC BLOOD PRESS GE 130-139MM HG: ICD-10-PCS | Mod: CPTII,,, | Performed by: STUDENT IN AN ORGANIZED HEALTH CARE EDUCATION/TRAINING PROGRAM

## 2023-05-22 PROCEDURE — 80053 COMPREHEN METABOLIC PANEL: CPT | Performed by: STUDENT IN AN ORGANIZED HEALTH CARE EDUCATION/TRAINING PROGRAM

## 2023-05-22 PROCEDURE — 99213 OFFICE O/P EST LOW 20 MIN: CPT | Mod: PBBFAC,PN | Performed by: STUDENT IN AN ORGANIZED HEALTH CARE EDUCATION/TRAINING PROGRAM

## 2023-05-22 PROCEDURE — 99214 OFFICE O/P EST MOD 30 MIN: CPT | Mod: S$PBB,,, | Performed by: STUDENT IN AN ORGANIZED HEALTH CARE EDUCATION/TRAINING PROGRAM

## 2023-05-22 PROCEDURE — 3079F DIAST BP 80-89 MM HG: CPT | Mod: CPTII,,, | Performed by: STUDENT IN AN ORGANIZED HEALTH CARE EDUCATION/TRAINING PROGRAM

## 2023-05-22 PROCEDURE — 84443 ASSAY THYROID STIM HORMONE: CPT | Performed by: STUDENT IN AN ORGANIZED HEALTH CARE EDUCATION/TRAINING PROGRAM

## 2023-05-22 PROCEDURE — 99214 PR OFFICE/OUTPT VISIT, EST, LEVL IV, 30-39 MIN: ICD-10-PCS | Mod: S$PBB,,, | Performed by: STUDENT IN AN ORGANIZED HEALTH CARE EDUCATION/TRAINING PROGRAM

## 2023-05-23 ENCOUNTER — PATIENT MESSAGE (OUTPATIENT)
Dept: FAMILY MEDICINE | Facility: CLINIC | Age: 33
End: 2023-05-23
Payer: COMMERCIAL
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
## 2023-05-26 ENCOUNTER — PATIENT MESSAGE (OUTPATIENT)
Dept: FAMILY MEDICINE | Facility: CLINIC | Age: 33
End: 2023-05-26
Payer: COMMERCIAL

## 2023-05-29 ENCOUNTER — TELEPHONE (OUTPATIENT)
Dept: FAMILY MEDICINE | Facility: CLINIC | Age: 33
End: 2023-05-29

## 2023-06-05 ENCOUNTER — TELEPHONE (OUTPATIENT)
Dept: FAMILY MEDICINE | Facility: CLINIC | Age: 33
End: 2023-06-05
Payer: COMMERCIAL

## 2023-06-09 ENCOUNTER — TELEPHONE (OUTPATIENT)
Dept: FAMILY MEDICINE | Facility: CLINIC | Age: 33
End: 2023-06-09
Payer: COMMERCIAL

## 2023-06-09 NOTE — TELEPHONE ENCOUNTER
Pt was contacted several times, left messages and no response ,will ask the  to follow up with a letter of contact .

## 2023-06-15 NOTE — TELEPHONE ENCOUNTER
Patient was contacted  numerous of time ,but was unable to reached and leave a message, Have our  to sent out a letter of contact.

## 2023-07-24 RX ORDER — ASPIRIN 325 MG
50000 TABLET, DELAYED RELEASE (ENTERIC COATED) ORAL
COMMUNITY
Start: 2023-04-21 | End: 2024-03-26

## 2023-07-24 NOTE — TELEPHONE ENCOUNTER
Refill Routing Note   Medication(s) are not appropriate for processing by [facility] for the following reason(s):      Medication outside of protocol    [facility] action(s):  Route Care Due:  None identified              Appointments  past 12m or future 3m with PCP    Date Provider   Last Visit   5/22/2023 Ada Miller MD   Next Visit   8/11/2023 Ada Miller MD   ED visits in past 90 days: 0        Note composed:4:54 PM 07/24/2023

## 2023-07-26 RX ORDER — ASPIRIN 325 MG
50000 TABLET, DELAYED RELEASE (ENTERIC COATED) ORAL
OUTPATIENT
Start: 2023-07-26

## 2023-08-11 ENCOUNTER — OFFICE VISIT (OUTPATIENT)
Dept: FAMILY MEDICINE | Facility: CLINIC | Age: 33
End: 2023-08-11
Payer: COMMERCIAL

## 2023-08-11 VITALS
DIASTOLIC BLOOD PRESSURE: 77 MMHG | BODY MASS INDEX: 42.44 KG/M2 | HEART RATE: 82 BPM | RESPIRATION RATE: 20 BRPM | TEMPERATURE: 99 F | HEIGHT: 67 IN | OXYGEN SATURATION: 100 % | SYSTOLIC BLOOD PRESSURE: 120 MMHG | WEIGHT: 270.38 LBS

## 2023-08-11 DIAGNOSIS — Z85.3: ICD-10-CM

## 2023-08-11 DIAGNOSIS — M54.42 CHRONIC BILATERAL LOW BACK PAIN WITH BILATERAL SCIATICA: ICD-10-CM

## 2023-08-11 DIAGNOSIS — F41.9 ANXIETY: ICD-10-CM

## 2023-08-11 DIAGNOSIS — M54.41 CHRONIC BILATERAL LOW BACK PAIN WITH BILATERAL SCIATICA: ICD-10-CM

## 2023-08-11 DIAGNOSIS — R45.89 ANXIETY ABOUT HEALTH: Primary | ICD-10-CM

## 2023-08-11 DIAGNOSIS — G43.009 MIGRAINE WITHOUT AURA AND WITHOUT STATUS MIGRAINOSUS, NOT INTRACTABLE: ICD-10-CM

## 2023-08-11 DIAGNOSIS — M54.9 DORSALGIA, UNSPECIFIED: ICD-10-CM

## 2023-08-11 DIAGNOSIS — G89.29 CHRONIC BILATERAL LOW BACK PAIN WITH BILATERAL SCIATICA: ICD-10-CM

## 2023-08-11 PROCEDURE — 3074F PR MOST RECENT SYSTOLIC BLOOD PRESSURE < 130 MM HG: ICD-10-PCS | Mod: CPTII,,, | Performed by: STUDENT IN AN ORGANIZED HEALTH CARE EDUCATION/TRAINING PROGRAM

## 2023-08-11 PROCEDURE — 3078F DIAST BP <80 MM HG: CPT | Mod: CPTII,,, | Performed by: STUDENT IN AN ORGANIZED HEALTH CARE EDUCATION/TRAINING PROGRAM

## 2023-08-11 PROCEDURE — 3078F PR MOST RECENT DIASTOLIC BLOOD PRESSURE < 80 MM HG: ICD-10-PCS | Mod: CPTII,,, | Performed by: STUDENT IN AN ORGANIZED HEALTH CARE EDUCATION/TRAINING PROGRAM

## 2023-08-11 PROCEDURE — 99214 OFFICE O/P EST MOD 30 MIN: CPT | Mod: S$PBB,,, | Performed by: STUDENT IN AN ORGANIZED HEALTH CARE EDUCATION/TRAINING PROGRAM

## 2023-08-11 PROCEDURE — 3008F PR BODY MASS INDEX (BMI) DOCUMENTED: ICD-10-PCS | Mod: CPTII,,, | Performed by: STUDENT IN AN ORGANIZED HEALTH CARE EDUCATION/TRAINING PROGRAM

## 2023-08-11 PROCEDURE — 1159F MED LIST DOCD IN RCRD: CPT | Mod: CPTII,,, | Performed by: STUDENT IN AN ORGANIZED HEALTH CARE EDUCATION/TRAINING PROGRAM

## 2023-08-11 PROCEDURE — 99214 OFFICE O/P EST MOD 30 MIN: CPT | Mod: PBBFAC,PN | Performed by: STUDENT IN AN ORGANIZED HEALTH CARE EDUCATION/TRAINING PROGRAM

## 2023-08-11 PROCEDURE — 3074F SYST BP LT 130 MM HG: CPT | Mod: CPTII,,, | Performed by: STUDENT IN AN ORGANIZED HEALTH CARE EDUCATION/TRAINING PROGRAM

## 2023-08-11 PROCEDURE — 3008F BODY MASS INDEX DOCD: CPT | Mod: CPTII,,, | Performed by: STUDENT IN AN ORGANIZED HEALTH CARE EDUCATION/TRAINING PROGRAM

## 2023-08-11 PROCEDURE — 1159F PR MEDICATION LIST DOCUMENTED IN MEDICAL RECORD: ICD-10-PCS | Mod: CPTII,,, | Performed by: STUDENT IN AN ORGANIZED HEALTH CARE EDUCATION/TRAINING PROGRAM

## 2023-08-11 PROCEDURE — 99214 PR OFFICE/OUTPT VISIT, EST, LEVL IV, 30-39 MIN: ICD-10-PCS | Mod: S$PBB,,, | Performed by: STUDENT IN AN ORGANIZED HEALTH CARE EDUCATION/TRAINING PROGRAM

## 2023-08-11 RX ORDER — VENLAFAXINE HYDROCHLORIDE 37.5 MG/1
37.5 CAPSULE, EXTENDED RELEASE ORAL DAILY
Qty: 30 CAPSULE | Refills: 11 | Status: SHIPPED | OUTPATIENT
Start: 2023-08-11 | End: 2024-03-26

## 2023-08-11 NOTE — ASSESSMENT & PLAN NOTE
Patient is breast cancer survivor. Discussed that I see nothing in recent bloodwork or imaging that makes me concerned for recurrence.

## 2023-08-11 NOTE — PROGRESS NOTES
Patient Name: Jazmine Welsh     : 1990    MRN: [id number]     Subjective:     Patient ID: Jazmine Welsh is a 33 y.o. female.    Chief Complaint:   Chief Complaint   Patient presents with    Follow-up     C/o generalized  pain SP 9 x 1month        HPI: HPI  32-year-old female presents to clinic today for complaint of generalized pain    Patient reports that she has pain in her back and in her legs and knees  States this has been going on for several months and would like to have an MRI of her body   Patient has had negative autoimmune panel    Anxiety  - Admits previously that she has had issues with depression after her . Prozac did help but was concerned about staying on medication long term so was discontinued. Remembers prior to that being on another medication but cannot recall the name. States that it made her feel too tired.   - Has witnessed terrible accident in which a person , was in a car accident, had her father pass away and was diagnosed with breast cancer in a short span of time.   Patient is asking whether some of her stress and tension and pain could be related to her anxiety    Also with complaint of new headaches that are centered around her temples states that she is been getting 3 of these headaches per week which lasts the entire day  States that she does not like to take Tylenol or ibuprofen secondary to it causing side pain when she takes the medication  States that when she gets these headaches she feels she is not as coordinated as previous because she feels foggy  Does complain of sensitivity to light and sound    history of phylloides tumor of the breast , back pain, depression and anxiety   States she no longer has follow-up with breast Clinic Has not had recurrence of breast cancer  States she did not have chemo    Chronic issues not discussed   History of thyroid nodule  Cannot see results in epic only that work up was done.   Reports that she was  told that she needed to have her thyroid removed by Dr. Foley.  States she is uncertain if she would like this done  Mother has hx of thyroid cancer  Chart records indicate that patient had referral to Dr. Foley and was referred to surgery clinic. Placed referral to ENT- patient was a no-show stating that she has anxiety about going to [facility] and is requesting a referral to a different doctor outside of [facility]     History of SVT  States she had ER visit for SVT. Was given medication but did not take medication  Referred last visit to cardiology clinic.   Also reports she did have a holter monitor in  with [facility] in [place]            Back pain  - - patient with long history of back pain states that it is becoming more frequent  Four years ago had been prescribed Flexeril which she did not take secondary to worrying that it would make her too sleepy  Denies any falls or trauma, loss of bladder or bowels or saddle anesthesia  States that pain radiates into her thighs.   Has not had PT.  Switched to naproxen and referred for PT  Also not old possible dislocation of coccyx on XRay        -   Other health concerns  -. 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 Earliest relative was aunt with colon cancer at 36. Patient did have c scope in  which was clean and report stated another c scope in .       ROS:      ROS     12 point review of systems conducted, negative except as stated in the history of present illness. See HPI for details.    History:     Past Medical History:   Diagnosis Date    Anxiety disorder, unspecified     GERD (gastroesophageal reflux disease)     Malignant phyllodes tumor of breast, left         Past Surgical History:   Procedure Laterality Date    BREAST BIOPSY      BREAST SURGERY       SECTION         Family History   Problem Relation Age of Onset    Thyroid cancer Mother         in her mid 30s    Dementia Maternal  "Grandmother     Thyroid cancer Maternal Grandfather         in his 60s    Lymphoma Maternal Uncle         mid 30s    Colon cancer Maternal Uncle         age unknown        Social History     Tobacco Use    Smoking status: Former     Current packs/day: 0.00     Types: Cigarettes     Passive exposure: Never    Smokeless tobacco: Never    Tobacco comments:     Quit smoking more than 5 yrs ago.   Substance and Sexual Activity    Alcohol use: Never    Drug use: Never    Sexual activity: Yes     Partners: Male       Current Outpatient Medications   Medication Instructions    cholecalciferol (vitamin D3) 50,000 Units, Oral, Every 7 days    venlafaxine (EFFEXOR XR) 37.5 mg, Oral, Daily        Review of patient's allergies indicates:  No Known Allergies    Objective:     Visit Vitals  /77 (BP Location: Left arm, Patient Position: Sitting, BP Method: Large (Automatic))   Pulse 82   Temp 98.5 °F (36.9 °C)   Resp 20   Ht 5' 7" (1.702 m)   Wt 122.7 kg (270 lb 6.4 oz)   SpO2 100%   BMI 42.35 kg/m²       Physical Examination:     Physical Exam  Constitutional:       General: She is not in acute distress.     Appearance: Normal appearance. She is not ill-appearing or diaphoretic.   HENT:      Nose: No congestion.   Eyes:      Conjunctiva/sclera: Conjunctivae normal.      Pupils: Pupils are equal, round, and reactive to light.   Cardiovascular:      Rate and Rhythm: Normal rate and regular rhythm.      Heart sounds: No murmur heard.  Pulmonary:      Effort: Pulmonary effort is normal. No respiratory distress.      Breath sounds: Normal breath sounds. No wheezing.   Musculoskeletal:         General: Tenderness present. No swelling, deformity or signs of injury. Normal range of motion.      Cervical back: Normal range of motion.      Comments: Patient with very tense muscles especially across trapezius region   Skin:     General: Skin is warm and dry.      Coloration: Skin is not jaundiced.   Neurological:      General: No focal " deficit present.      Mental Status: She is alert and oriented to person, place, and time. Mental status is at baseline.      Cranial Nerves: No cranial nerve deficit.      Coordination: Coordination normal.      Gait: Gait normal.         Lab Results:     Chemistry:  Lab Results   Component Value Date     05/22/2023    K 3.9 05/22/2023    CHLORIDE 108 (H) 05/22/2023    BUN 8.8 05/22/2023    CREATININE 0.86 05/22/2023    EGFRNORACEVR >60 05/22/2023    GLUCOSE 76 05/22/2023    CALCIUM 9.8 05/22/2023    ALKPHOS 64 05/22/2023    LABPROT 8.3 05/22/2023    ALBUMIN 4.3 05/22/2023    BILIDIR 0.2 11/18/2021    IBILI 0.20 11/18/2021    AST 17 05/22/2023    ALT 18 05/22/2023    MG 1.90 09/03/2021    VCKPUVBC18WP 13.6 (L) 01/24/2023    TSH 0.366 05/22/2023    AKEDDH1BRAE 0.85 12/21/2022        Lab Results   Component Value Date    HGBA1C 4.9 12/21/2022        Hematology:  Lab Results   Component Value Date    WBC 5.22 05/22/2023    HGB 12.7 05/22/2023    HCT 40.7 05/22/2023     05/22/2023       Lipid Panel:  Lab Results   Component Value Date    CHOL 166 12/21/2022    HDL 49 12/21/2022    .00 12/21/2022    TRIG 58 12/21/2022    TOTALCHOLEST 3 12/21/2022        Urine:  Lab Results   Component Value Date    COLORUA Light-Yellow 12/21/2022    APPEARANCEUA Clear 12/21/2022    SGUA 1.025 12/21/2022    PHUA 6.5 12/21/2022    PROTEINUA Negative 12/21/2022    GLUCOSEUA Normal 12/21/2022    KETONESUA Negative 12/21/2022    BLOODUA Negative 12/21/2022    NITRITESUA Negative 12/21/2022    LEUKOCYTESUR Negative 12/21/2022    RBCUA 0-5 12/21/2022    WBCUA 0-5 12/21/2022    BACTERIA None Seen 12/21/2022    SQEPUA Occ (A) 12/21/2022    HYALINECASTS None Seen 12/21/2022    WILLIS 485.0 07/29/2019        Assessment:          ICD-10-CM ICD-9-CM   1. Anxiety about health  F41.8 300.09   2. History of malignant phyllodes tumor of breast  Z85.3 V10.3   3. Chronic bilateral low back pain with bilateral sciatica  M54.42  724.2    M54.41 724.3    G89.29 338.29   4. Anxiety  F41.9 300.00   5. Migraine without aura and without status migrainosus, not intractable  G43.009 346.10   6. Dorsalgia, unspecified  M54.9 724.5        Plan:     1. Anxiety about health  Assessment & Plan:  Patient is breast cancer survivor. Discussed that I see nothing in recent bloodwork or imaging that makes me concerned for recurrence.      Orders:  -     venlafaxine (EFFEXOR XR) 37.5 MG 24 hr capsule; Take 1 capsule (37.5 mg total) by mouth once daily.  Dispense: 30 capsule; Refill: 11    2. History of malignant phyllodes tumor of breast  Assessment & Plan:  Discussion with patient and offered to refer back to Oncology as she states she would like her entire body scan as she is concerned about recurrence of the cancer cancer spreading to her bones  Patient declines referral stating that she will call her insurance company and find providers in her network      3. Chronic bilateral low back pain with bilateral sciatica  Assessment & Plan:  Patient is requesting an MRI of her back and the rest of her body   Did explained to patient that conservative therapy must be tried 1st will place referral for MRI but did explained to patient that this likely will not be covered  Referring patient to physical therapy      4. Anxiety    5. Migraine without aura and without status migrainosus, not intractable  Assessment & Plan:  Will refer for MRI and referral to neurology   Did discuss with patient stress relieving techniques/yoga  Patient initially wanted something over-the-counter and natural to treat her migraine headaches   Suggested Lavender to patient  Patient is now amenable to try venlafaxine for this as well as her anxiety    Orders:  -     Ambulatory referral/consult to Neurology; Future; Expected date: 08/18/2023  -     MRI Brain Without Contrast; Future; Expected date: 08/11/2023    6. Dorsalgia, unspecified  -     MRI Lumbar Spine Without Contrast; Future;  Expected date: 08/11/2023         Follow up in about 1 month (around 9/11/2023) for Virtual Visit, anxiety.    Future Appointments   Date Time Provider Department Center   9/11/2023  1:30 PM Ada Miller MD [facility]        Ada Miller MD

## 2023-08-11 NOTE — ASSESSMENT & PLAN NOTE
Patient is requesting an MRI of her back and the rest of her body   Did explained to patient that conservative therapy must be tried 1st will place referral for MRI but did explained to patient that this likely will not be covered  Referring patient to physical therapy

## 2023-08-11 NOTE — ASSESSMENT & PLAN NOTE
Discussion with patient and offered to refer back to Oncology as she states she would like her entire body scan as she is concerned about recurrence of the cancer cancer spreading to her bones  Patient declines referral stating that she will call her insurance company and find providers in her network

## 2023-08-11 NOTE — ASSESSMENT & PLAN NOTE
Will refer for MRI and referral to neurology   Did discuss with patient stress relieving techniques/yoga  Patient initially wanted something over-the-counter and natural to treat her migraine headaches   Suggested Lavender to patient  Patient is now amenable to try venlafaxine for this as well as her anxiety

## 2023-08-28 ENCOUNTER — PATIENT MESSAGE (OUTPATIENT)
Dept: FAMILY MEDICINE | Facility: CLINIC | Age: 33
End: 2023-08-28
Payer: COMMERCIAL

## 2023-10-17 ENCOUNTER — ANESTHESIA (OUTPATIENT)
Dept: ENDOSCOPY | Facility: HOSPITAL | Age: 33
End: 2023-10-17
Payer: COMMERCIAL

## 2023-10-17 ENCOUNTER — HOSPITAL ENCOUNTER (OUTPATIENT)
Facility: HOSPITAL | Age: 33
Discharge: HOME | End: 2023-10-17
Attending: INTERNAL MEDICINE | Admitting: INTERNAL MEDICINE
Payer: COMMERCIAL

## 2023-10-17 ENCOUNTER — ANESTHESIA EVENT (OUTPATIENT)
Dept: ENDOSCOPY | Facility: HOSPITAL | Age: 33
End: 2023-10-17
Payer: COMMERCIAL

## 2023-10-17 VITALS
TEMPERATURE: 97 F | BODY MASS INDEX: 42.38 KG/M2 | HEART RATE: 83 BPM | HEIGHT: 67 IN | DIASTOLIC BLOOD PRESSURE: 70 MMHG | SYSTOLIC BLOOD PRESSURE: 123 MMHG | OXYGEN SATURATION: 96 % | RESPIRATION RATE: 12 BRPM | WEIGHT: 270 LBS

## 2023-10-17 DIAGNOSIS — R13.10 DYSPHAGIA, UNSPECIFIED TYPE: ICD-10-CM

## 2023-10-17 DIAGNOSIS — R14.0 BLOATING: ICD-10-CM

## 2023-10-17 DIAGNOSIS — R19.8 ALTERNATING CONSTIPATION AND DIARRHEA: ICD-10-CM

## 2023-10-17 DIAGNOSIS — K21.9 GERD WITHOUT ESOPHAGITIS: ICD-10-CM

## 2023-10-17 DIAGNOSIS — R19.7 NOCTURNAL DIARRHEA: ICD-10-CM

## 2023-10-17 DIAGNOSIS — K42.9 UMBILICAL HERNIA WITHOUT OBSTRUCTION AND WITHOUT GANGRENE: ICD-10-CM

## 2023-10-17 DIAGNOSIS — R10.13 EPIGASTRIC PAIN: ICD-10-CM

## 2023-10-17 LAB
B-HCG UR QL: NEGATIVE
CTP QC/QA: YES

## 2023-10-17 PROCEDURE — D9220A PRA ANESTHESIA: Mod: CRNA,,, | Performed by: NURSE ANESTHETIST, CERTIFIED REGISTERED

## 2023-10-17 PROCEDURE — 63600175 PHARM REV CODE 636 W HCPCS: Performed by: NURSE ANESTHETIST, CERTIFIED REGISTERED

## 2023-10-17 PROCEDURE — D9220A PRA ANESTHESIA: ICD-10-PCS | Mod: ANES,,, | Performed by: ANESTHESIOLOGY

## 2023-10-17 PROCEDURE — 25000003 PHARM REV CODE 250: Performed by: NURSE ANESTHETIST, CERTIFIED REGISTERED

## 2023-10-17 PROCEDURE — 43239 EGD BIOPSY SINGLE/MULTIPLE: CPT | Performed by: INTERNAL MEDICINE

## 2023-10-17 PROCEDURE — 43450 DILATE ESOPHAGUS 1/MULT PASS: CPT | Performed by: INTERNAL MEDICINE

## 2023-10-17 PROCEDURE — 37000009 HC ANESTHESIA EA ADD 15 MINS: Performed by: INTERNAL MEDICINE

## 2023-10-17 PROCEDURE — D9220A PRA ANESTHESIA: ICD-10-PCS | Mod: CRNA,,, | Performed by: NURSE ANESTHETIST, CERTIFIED REGISTERED

## 2023-10-17 PROCEDURE — 27201423 OPTIME MED/SURG SUP & DEVICES STERILE SUPPLY: Performed by: INTERNAL MEDICINE

## 2023-10-17 PROCEDURE — D9220A PRA ANESTHESIA: Mod: ANES,,, | Performed by: ANESTHESIOLOGY

## 2023-10-17 PROCEDURE — 81025 URINE PREGNANCY TEST: CPT | Performed by: ANESTHESIOLOGY

## 2023-10-17 PROCEDURE — 37000008 HC ANESTHESIA 1ST 15 MINUTES: Performed by: INTERNAL MEDICINE

## 2023-10-17 RX ORDER — LIDOCAINE HYDROCHLORIDE 20 MG/ML
INJECTION, SOLUTION EPIDURAL; INFILTRATION; INTRACAUDAL; PERINEURAL
Status: DISCONTINUED
Start: 2023-10-17 | End: 2023-10-17 | Stop reason: HOSPADM

## 2023-10-17 RX ORDER — GLYCOPYRROLATE 0.2 MG/ML
INJECTION INTRAMUSCULAR; INTRAVENOUS
Status: DISCONTINUED
Start: 2023-10-17 | End: 2023-10-17 | Stop reason: HOSPADM

## 2023-10-17 RX ORDER — IPRATROPIUM BROMIDE AND ALBUTEROL SULFATE 2.5; .5 MG/3ML; MG/3ML
3 SOLUTION RESPIRATORY (INHALATION)
Status: CANCELLED | OUTPATIENT
Start: 2023-10-17

## 2023-10-17 RX ORDER — ONDANSETRON 2 MG/ML
4 INJECTION INTRAMUSCULAR; INTRAVENOUS DAILY PRN
Status: CANCELLED | OUTPATIENT
Start: 2023-10-17

## 2023-10-17 RX ORDER — PROCHLORPERAZINE EDISYLATE 5 MG/ML
5 INJECTION INTRAMUSCULAR; INTRAVENOUS EVERY 30 MIN PRN
Status: CANCELLED | OUTPATIENT
Start: 2023-10-17

## 2023-10-17 RX ORDER — KETAMINE HCL IN 0.9 % NACL 50 MG/5 ML
SYRINGE (ML) INTRAVENOUS
Status: DISCONTINUED
Start: 2023-10-17 | End: 2023-10-17 | Stop reason: HOSPADM

## 2023-10-17 RX ORDER — KETAMINE HYDROCHLORIDE 100 MG/ML
INJECTION, SOLUTION INTRAMUSCULAR; INTRAVENOUS
Status: DISCONTINUED | OUTPATIENT
Start: 2023-10-17 | End: 2023-10-17

## 2023-10-17 RX ORDER — ONDANSETRON 4 MG/1
8 TABLET, ORALLY DISINTEGRATING ORAL EVERY 6 HOURS PRN
Status: DISCONTINUED | OUTPATIENT
Start: 2023-10-17 | End: 2023-10-17 | Stop reason: HOSPADM

## 2023-10-17 RX ORDER — LIDOCAINE HYDROCHLORIDE 20 MG/ML
INJECTION INTRAVENOUS
Status: DISCONTINUED | OUTPATIENT
Start: 2023-10-17 | End: 2023-10-17

## 2023-10-17 RX ORDER — OMEPRAZOLE 40 MG/1
40 CAPSULE, DELAYED RELEASE ORAL DAILY
Qty: 30 CAPSULE | Refills: 2 | Status: SHIPPED | OUTPATIENT
Start: 2023-10-17 | End: 2024-10-16

## 2023-10-17 RX ORDER — LIDOCAINE HYDROCHLORIDE 10 MG/ML
1 INJECTION, SOLUTION EPIDURAL; INFILTRATION; INTRACAUDAL; PERINEURAL ONCE
Status: DISCONTINUED | OUTPATIENT
Start: 2023-10-17 | End: 2023-10-17 | Stop reason: HOSPADM

## 2023-10-17 RX ORDER — PROPOFOL 10 MG/ML
VIAL (ML) INTRAVENOUS
Status: DISCONTINUED
Start: 2023-10-17 | End: 2023-10-17 | Stop reason: HOSPADM

## 2023-10-17 RX ORDER — PROPOFOL 10 MG/ML
VIAL (ML) INTRAVENOUS
Status: DISCONTINUED | OUTPATIENT
Start: 2023-10-17 | End: 2023-10-17

## 2023-10-17 RX ORDER — MEPERIDINE HYDROCHLORIDE 25 MG/ML
12.5 INJECTION INTRAMUSCULAR; INTRAVENOUS; SUBCUTANEOUS EVERY 10 MIN PRN
Status: CANCELLED | OUTPATIENT
Start: 2023-10-17 | End: 2023-10-18

## 2023-10-17 RX ORDER — LORAZEPAM 2 MG/ML
0.25 INJECTION INTRAMUSCULAR ONCE AS NEEDED
Status: CANCELLED | OUTPATIENT
Start: 2023-10-17 | End: 2035-03-15

## 2023-10-17 RX ORDER — GLYCOPYRROLATE 0.2 MG/ML
INJECTION INTRAMUSCULAR; INTRAVENOUS
Status: DISCONTINUED | OUTPATIENT
Start: 2023-10-17 | End: 2023-10-17

## 2023-10-17 RX ORDER — SODIUM CHLORIDE, SODIUM GLUCONATE, SODIUM ACETATE, POTASSIUM CHLORIDE AND MAGNESIUM CHLORIDE 30; 37; 368; 526; 502 MG/100ML; MG/100ML; MG/100ML; MG/100ML; MG/100ML
INJECTION, SOLUTION INTRAVENOUS CONTINUOUS
Status: DISCONTINUED | OUTPATIENT
Start: 2023-10-17 | End: 2023-10-17 | Stop reason: HOSPADM

## 2023-10-17 RX ADMIN — KETAMINE HYDROCHLORIDE 25 MG: 100 INJECTION INTRAMUSCULAR; INTRAVENOUS at 10:10

## 2023-10-17 RX ADMIN — GLYCOPYRROLATE 0.2 MG: 0.2 INJECTION INTRAMUSCULAR; INTRAVENOUS at 10:10

## 2023-10-17 RX ADMIN — PROPOFOL 50 MG: 10 INJECTION, EMULSION INTRAVENOUS at 10:10

## 2023-10-17 RX ADMIN — SODIUM CHLORIDE, SODIUM GLUCONATE, SODIUM ACETATE, POTASSIUM CHLORIDE AND MAGNESIUM CHLORIDE: 526; 502; 368; 37; 30 INJECTION, SOLUTION INTRAVENOUS at 10:10

## 2023-10-17 RX ADMIN — KETAMINE HYDROCHLORIDE 5 MG: 100 INJECTION INTRAMUSCULAR; INTRAVENOUS at 10:10

## 2023-10-17 RX ADMIN — PROPOFOL 60 MG: 10 INJECTION, EMULSION INTRAVENOUS at 10:10

## 2023-10-17 RX ADMIN — LIDOCAINE HYDROCHLORIDE 100 MG: 20 INJECTION INTRAVENOUS at 10:10

## 2023-10-17 RX ADMIN — PROPOFOL 40 MG: 10 INJECTION, EMULSION INTRAVENOUS at 10:10

## 2023-10-17 NOTE — H&P
"Gastroenterology Note    CC: GERD, change in bowel habits    HPI 33 y.o. female presents for an EGD and a colonoscopy due to GERD and a change in bowel habits.  Protonix caused abdominal pain so the patient stopped this medication.    Past Medical History:   Diagnosis Date    Anxiety disorder, unspecified     Essential (primary) hypertension     GERD (gastroesophageal reflux disease)     Malignant phyllodes tumor of breast, left     SVT (supraventricular tachycardia)          Review of Systems  General ROS: negative for - chills, fever or weight loss  Cardiovascular ROS: no chest pain or dyspnea on exertion  Gastrointestinal ROS: as per HPI    Physical Examination  /73 (BP Location: Left arm, Patient Position: Lying)   Pulse 80   Temp (!) 95.5 °F (35.3 °C) (Temporal)   Resp 15   Ht 5' 7" (1.702 m)   Wt 122.5 kg (270 lb)   SpO2 96%   BMI 42.29 kg/m²   General appearance: alert, cooperative, no distress  HENT: Normocephalic, atraumatic, neck symmetrical, no nasal discharge   Lungs: clear to auscultation bilaterally, symmetric chest wall expansion bilaterally  Heart: regular rate and rhythm without rub; no displacement of the PMI   Abdomen: soft NT ND BS present  Extremities: extremities symmetric; no clubbing, cyanosis, or edema  Neurologic: Alert and oriented X 3, normal strength, normal coordination and gait    Assessment:   - GERD  - Change in bowel habits    Plan:  - EGD and colonoscopy today      "

## 2023-10-17 NOTE — PROVATION PATIENT INSTRUCTIONS
Discharge Summary/Instructions after an Endoscopic Procedure  Patient Name: Jazmine Welsh  Patient MRN: [id number]  Patient YOB: 1990 Tuesday, October 17, 2023  Miky Ortega MD  Dear patient,  As a result of recent federal legislation (The Federal Cures Act), you may   receive lab or pathology results from your procedure in your [facility]   account before your physician is able to contact you. Your physician or   their representative will relay the results to you with their   recommendations at their soonest availability.  Thank you,  RESTRICTIONS:  During your procedure today, you received medications for sedation.  These   medications may affect your judgment, balance and coordination.  Therefore,   for 24 hours, you have the following restrictions:   - DO NOT drive a car, operate machinery, make legal/financial decisions,   sign important papers or drink alcohol.    ACTIVITY:  Today: no heavy lifting, straining or running due to procedural   sedation/anesthesia.  The following day: return to full activity including work.  DIET:  Eat and drink normally unless instructed otherwise.     TREATMENT FOR COMMON SIDE EFFECTS:  - Mild abdominal pain, nausea, belching, bloating or excessive gas:  rest,   eat lightly and use a heating pad.  - Sore Throat: treat with throat lozenges and/or gargle with warm salt   water.  - Because air was used during the procedure, expelling large amounts of air   from your rectum or belching is normal.  - If a bowel prep was taken, you may not have a bowel movement for 1-3 days.    This is normal.  SYMPTOMS TO WATCH FOR AND REPORT TO YOUR PHYSICIAN:  1. Abdominal pain or bloating, other than gas cramps.  2. Chest pain.  3. Back pain.  4. Signs of infection such as: chills or fever occurring within 24 hours   after the procedure.  5. Rectal bleeding, which would show as bright red, maroon, or black stools.   (A tablespoon of blood from the rectum is not serious, especially  if   hemorrhoids are present.)  6. Vomiting.  7. Weakness or dizziness.  GO DIRECTLY TO THE NEAREST EMERGENCY ROOM IF YOU HAVE ANY OF THE FOLLOWING:      Difficulty breathing              Chills and/or fever over 101 F   Persistent vomiting and/or vomiting blood   Severe abdominal pain   Severe chest pain   Black, tarry stools   Bleeding- more than one tablespoon   Any other symptom or condition that you feel may need urgent attention  Your doctor recommends these additional instructions:  If any biopsies were taken, your doctors clinic will contact you in 1 to 2   weeks with any results.  - Discharge patient to home.   - See the other procedure note for documentation of additional   recommendations.  For questions, problems or results please call your physician - Miky Ortega MD at Work:  [phone].  [facility] [place] [facility], EMERGENCY ROOM PHONE NUMBER: [phone]  IF A COMPLICATION OR EMERGENCY SITUATION ARISES AND YOU ARE UNABLE TO REACH   YOUR PHYSICIAN - GO DIRECTLY TO THE EMERGENCY ROOM.  Miky Ortega MD  10/17/2023 10:42:40 AM  This report has been verified and signed electronically.  Dear patient,  As a result of recent federal legislation (The Federal Cures Act), you may   receive lab or pathology results from your procedure in your [facility]   account before your physician is able to contact you. Your physician or   their representative will relay the results to you with their   recommendations at their soonest availability.  Thank you,  PROVATION

## 2023-10-17 NOTE — TRANSFER OF CARE
"Anesthesia Transfer of Care Note    Patient: Jazmine Welsh    Procedure(s) Performed: Procedure(s) (LRB):  DOUBLE (N/A)  COLON (N/A)  EGD, WITH CLOSED BIOPSY    Patient location: GI    Anesthesia Type: general    Transport from OR: Transported from OR on room air with adequate spontaneous ventilation    Post pain: adequate analgesia    Post assessment: no apparent anesthetic complications and tolerated procedure well    Post vital signs: stable    Level of consciousness: awake, alert and oriented    Nausea/Vomiting: no nausea/vomiting    Complications: none    Transfer of care protocol was followed      Last vitals:   Visit Vitals  /72   Pulse 106   Temp (!) 35.3 °C (95.5 °F) (Temporal)   Resp 17   Ht 5' 7" (1.702 m)   Wt 122.5 kg (270 lb)   SpO2 99%   BMI 42.29 kg/m²     "

## 2023-10-17 NOTE — ANESTHESIA PREPROCEDURE EVALUATION
"                                                                                                             10/17/2023  Jazmine Welsh is a 33 y.o., female with BMI of 42 and paroxysmal SVT (rarely feels mild palpitation) presents as an outpatient for EGD and colonoscopy.    Last 3 sets of Vitals        8/11/2023     8:32 AM 10/16/2023     1:00 PM 10/17/2023     8:50 AM   Vitals - 1 value per visit   SYSTOLIC 120  137   DIASTOLIC 77  73   Pulse 82  80   Temp 36.9 °C (98.5 °F)  35.3 °C (95.5 °F)   Resp 20  15   SPO2 100 %  96 %   Weight (lb) 270.4 270    Weight (kg) 122.653 122.471    Height 5' 7" (1.702 m) 5' 7" (1.702 m)    BMI (Calculated) 42.3 42.3    Pain Score Nine           Lab Results   Component Value Date    WBC 5.22 05/22/2023    HGB 12.7 05/22/2023    HCT 40.7 05/22/2023    MCV 88.1 05/22/2023     05/22/2023          BMP  Lab Results   Component Value Date     05/22/2023    K 3.9 05/22/2023     09/25/2021    CO2 22 05/22/2023    BUN 8.8 05/22/2023    CREATININE 0.86 05/22/2023    CALCIUM 9.8 05/22/2023    ANIONGAP 13 09/25/2021    ESTGFRAFRICA 117 09/25/2021    EGFRNONAA 81 (L) 11/18/2021      Pre-op Assessment    I have reviewed the Patient Summary Reports.    I have reviewed the NPO Status.   I have reviewed the Medications.     Review of Systems  Anesthesia Hx:   Denies Personal Hx of Anesthesia complications.   Social:  Non-Smoker    Cardiovascular:   Hypertension  Functional Capacity good / => 4 METS  Disorder of Cardiac Rhythm, Paroxysmal Supraventricular Tachycardia (PSVT)    Hepatic/GI:   GERD, well controlled        Physical Exam  General: Well nourished, Cooperative, Alert and Oriented    Airway:  Mallampati: II   Mouth Opening: Normal  TM Distance: Normal  Tongue: Normal  Neck ROM: Normal ROM  Under bite  Dental:  Intact    Chest/Lungs:  Clear to auscultation, Normal Respiratory Rate    Heart:  Rate: Normal  Rhythm: Regular Rhythm        Anesthesia Plan  Type of " Anesthesia, risks & benefits discussed:    Anesthesia Type: Gen Natural Airway  Intra-op Monitoring Plan: Standard ASA Monitors  Induction:  IV  Informed Consent: Informed consent signed with the Patient and all parties understand the risks and agree with anesthesia plan.  All questions answered.   ASA Score: 3  Day of Surgery Review of History & Physical: H&P Update referred to the surgeon/provider.    Ready For Surgery From Anesthesia Perspective.     .

## 2023-10-17 NOTE — ANESTHESIA POSTPROCEDURE EVALUATION
Anesthesia Post Evaluation    Patient: Jazmine Welsh    Procedure(s) Performed: Procedure(s) (LRB):  DOUBLE (N/A)  COLON (N/A)  EGD, WITH CLOSED BIOPSY  COLONOSCOPY, WITH 1 OR MORE BIOPSIES (N/A)    Final Anesthesia Type: general      Patient location during evaluation: GI PACU  Patient participation: Yes- Able to Participate  Level of consciousness: awake and alert  Post-procedure vital signs: reviewed and stable  Pain management: adequate  Airway patency: patent    PONV status at discharge: No PONV  Anesthetic complications: no      Cardiovascular status: blood pressure returned to baseline  Respiratory status: spontaneous ventilation and room air  Hydration status: euvolemic  Follow-up not needed.          Vitals Value Taken Time   /70 10/17/23 1104   Temp 36.3 °C (97.3 °F) 10/17/23 1044   Pulse 83 10/17/23 1104   Resp 12 10/17/23 1104   SpO2 96 % 10/17/23 1104         No case tracking events are documented in the log.      Pain/Yen Score: Yen Score: 10 (10/17/2023 11:04 AM)

## 2023-10-17 NOTE — PROVATION PATIENT INSTRUCTIONS
Discharge Summary/Instructions after an Endoscopic Procedure  Patient Name: Jazmine Welsh  Patient MRN: [id number]  Patient YOB: 1990 Tuesday, October 17, 2023  Miky Ortega MD  Dear patient,  As a result of recent federal legislation (The Federal Cures Act), you may   receive lab or pathology results from your procedure in your [facility]   account before your physician is able to contact you. Your physician or   their representative will relay the results to you with their   recommendations at their soonest availability.  Thank you,  RESTRICTIONS:  During your procedure today, you received medications for sedation.  These   medications may affect your judgment, balance and coordination.  Therefore,   for 24 hours, you have the following restrictions:   - DO NOT drive a car, operate machinery, make legal/financial decisions,   sign important papers or drink alcohol.    ACTIVITY:  Today: no heavy lifting, straining or running due to procedural   sedation/anesthesia.  The following day: return to full activity including work.  DIET:  Eat and drink normally unless instructed otherwise.     TREATMENT FOR COMMON SIDE EFFECTS:  - Mild abdominal pain, nausea, belching, bloating or excessive gas:  rest,   eat lightly and use a heating pad.  - Sore Throat: treat with throat lozenges and/or gargle with warm salt   water.  - Because air was used during the procedure, expelling large amounts of air   from your rectum or belching is normal.  - If a bowel prep was taken, you may not have a bowel movement for 1-3 days.    This is normal.  SYMPTOMS TO WATCH FOR AND REPORT TO YOUR PHYSICIAN:  1. Abdominal pain or bloating, other than gas cramps.  2. Chest pain.  3. Back pain.  4. Signs of infection such as: chills or fever occurring within 24 hours   after the procedure.  5. Rectal bleeding, which would show as bright red, maroon, or black stools.   (A tablespoon of blood from the rectum is not serious, especially  if   hemorrhoids are present.)  6. Vomiting.  7. Weakness or dizziness.  GO DIRECTLY TO THE NEAREST EMERGENCY ROOM IF YOU HAVE ANY OF THE FOLLOWING:      Difficulty breathing              Chills and/or fever over 101 F   Persistent vomiting and/or vomiting blood   Severe abdominal pain   Severe chest pain   Black, tarry stools   Bleeding- more than one tablespoon   Any other symptom or condition that you feel may need urgent attention  Your doctor recommends these additional instructions:  If any biopsies were taken, your doctors clinic will contact you in 1 to 2   weeks with any results.  - Discharge patient to home.   - Resume previous diet.   - Use Prilosec (omeprazole) 40 mg PO daily. Stop Protonix as it caused   abdominal pain.  - Await pathology results.   - Repeat colonoscopy in 10 years for screening purposes.   - Return to GI office at appointment to be scheduled.   - The findings and recommendations were discussed with the patient and their   spouse.   - Patient has a contact number available for emergencies.  The signs and   symptoms of potential delayed complications were discussed with the   patient.  Return to normal activities tomorrow.  Written discharge   instructions were provided to the patient.  For questions, problems or results please call your physician - Miky Ortega MD at Work:  [phone].  [facility], EMERGENCY ROOM PHONE NUMBER: [phone]  IF A COMPLICATION OR EMERGENCY SITUATION ARISES AND YOU ARE UNABLE TO REACH   YOUR PHYSICIAN - GO DIRECTLY TO THE EMERGENCY ROOM.  Miky Ortega MD  10/17/2023 10:53:44 AM  This report has been verified and signed electronically.  Dear patient,  As a result of recent federal legislation (The Federal Cures Act), you may   receive lab or pathology results from your procedure in your [facility]   account before your physician is able to contact you. Your physician or   their representative will relay the results to you with  their   recommendations at their soonest availability.  Thank you,  PROVATION

## 2023-10-18 LAB — PSYCHE PATHOLOGY RESULT: NORMAL

## 2024-01-05 ENCOUNTER — PATIENT OUTREACH (OUTPATIENT)
Dept: ADMINISTRATIVE | Facility: HOSPITAL | Age: 34
End: 2024-01-05
Payer: COMMERCIAL

## 2024-03-21 ENCOUNTER — HOSPITAL ENCOUNTER (OUTPATIENT)
Dept: RADIOLOGY | Facility: HOSPITAL | Age: 34
Discharge: HOME | End: 2024-03-21
Attending: PHYSICIAN ASSISTANT
Payer: COMMERCIAL

## 2024-03-21 VITALS — HEIGHT: 67 IN | BODY MASS INDEX: 42.38 KG/M2 | WEIGHT: 270 LBS

## 2024-03-21 DIAGNOSIS — N64.4 BREAST PAIN: ICD-10-CM

## 2024-03-21 PROCEDURE — 77066 DX MAMMO INCL CAD BI: CPT | Mod: TC

## 2024-03-21 PROCEDURE — 76642 ULTRASOUND BREAST LIMITED: CPT | Mod: TC,LT

## 2024-03-21 PROCEDURE — 77062 BREAST TOMOSYNTHESIS BI: CPT | Mod: 26,,, | Performed by: STUDENT IN AN ORGANIZED HEALTH CARE EDUCATION/TRAINING PROGRAM

## 2024-03-21 PROCEDURE — 76642 ULTRASOUND BREAST LIMITED: CPT | Mod: 26,LT,, | Performed by: STUDENT IN AN ORGANIZED HEALTH CARE EDUCATION/TRAINING PROGRAM

## 2024-03-21 PROCEDURE — 77066 DX MAMMO INCL CAD BI: CPT | Mod: 26,,, | Performed by: STUDENT IN AN ORGANIZED HEALTH CARE EDUCATION/TRAINING PROGRAM

## 2024-03-25 NOTE — PROGRESS NOTES
[facility] Breast Surg  Breast Surgical Oncology  Follow Up Patient Office Visit - H&P      Referring Provider: No ref. provider found  PCP: Ada Miller MD   Care Team:  OBGYN: No data on file.  GI: Dr. Miky Ortega      Chief Complaint:   Chief Complaint   Patient presents with    Follow-up     CBE follow up visit.       Subjective:     Treatments:   4/24/2015 Left Breast Excisional biopsy at [facility] - Malignant Phyllodes Tumor   6/25/2015 Re-excision margin clear  Tumor board: No radiation or Medical oncology needed.   10/5/2015 [facility] genetic testing - Negative no clinically significant mutation identified. Additional Findings- variants of uncertain significance identified PMS2 and STK11 gene    Interval History:  03/25/2024 - Jazmine Welsh is here today for high risk follow up and for evaluation of bilateral pain in the breast breasts and axillae x's 1 year and worsening in the last 6 months. The pain is constant and described as a sharp pain, sometimes a nerve-like pain. She also reports occasional left upper arm pain and less often but occasional left hand numbness. She also endorses irregular menstrual cycles. She does not take hormones or birth control. She does not currently have an OBGYN and plans to establish care soon, unsure of doctors name (possibly Dr. Keen?). She also plans to establish care with a new PCP soon, Dr. Barbie Marte. She states that she is UTD on other screening exams including a colonoscopy and upper GI scope with Dr. Ortega in 2023, which was benign. She continues follow up with ENT and endocrinologist for multinodular nontoxic goiter. Last year, TSH was WNL.    HPI:  Jazmine Welsh is a 33 y.o. female patient who presents to establish care with breast clinic for high-risk surveillance. The patient has previously undergone genetic testing with Dr. 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    Patient has a extensive breast history. 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 She originally went in due to a left breast mass that was increasing in size and had associated pain. A US revealed a 6.1 x 3.5 x 5.3 cm well defined mass which at the time was suggestive of a hamartoma. Needle biopsy was performed and showed spindle cell lesion. Results from the excisional biopsy revealed a malignant phyllodes tumor with liposarcomatous differentiation measuring 9 cm. Due to close margin of less than 1 mm, she underwent re-excision on 5/2015 and pathology showed no signs of malignancy. Tumor board recommended an US of the left axilla which was done and showed an abnormal lymph node. Fine-needle aspiration of a left axillary lymph node on 6/25/2015 demonstrated polymorphous lymphoid proliferation, suggestive of reactive hyperplasia. Tumor board also decided there was no need for radiation or medical adjuvant therapy.     Her breast breast history includes multiple benign biopsies including a fine-needle aspiration of her right breast which was reportedly benign. She more recently had a benign needle biopsy in 1/2021 on the right breast, which revealed a benign fibroadenoma. Short interval follow up imaging was stable.     Genetic Testing:  10/5/2015 - [facility] genetic analysis - no deleterious mutations found in any of the tested genes. However, [facility] reported two variants of uncertain significance: PMS2 c.2108C>T ([id number]) (aka T703M *(2108C>T)) and STK11 c.598-8C>T.      Imaging:   10/19/2022 BL DG MG and BL Axilla US at [facility]- BENIGN.  No mammographic evidence of malignancy is seen involving either breast.  No sonographic evidence of malignancy is seen involving either axilla. No suspicious mammographic or sonographic findings are seen in the bilateral axillae, in the areas of the patient's pain.  Benign,  accessory breast tissue is noted in the bilateral axillae.  3/21/2024 BL DG MG and L breast US at [facility] to evaluate area of concern (nonfocal, intermittent pain) in her left breast upper outer quadrant with radiation into her left axilla x1 year  - BIRADS 2: BENIGN:   No mammographic evidence of malignancy in either breast.  No sonographic evidence of malignancy in the left axilla and evaluated portions of the left breast. On mammogram, there is accessory breast tissue in both axillae, right slightly greater than left, stable compared to prior mammograms and benign.   There is stable postsurgical change of the left breast related to prior lumpectomy and cavity re-excision for a malignant phyllodes tumor with liposarcomatous differentiation. No detrimental interval change at this surgical bed.   No imaging correlate for the left breast and left axilla area of concern. There is scant, patchy fibroductoglandular tissue in the left axilla, benign. No suspicious adenopathy in the left axilla. A negative or benign assessment on mammogram and ultrasound should not preclude further investigation of a clinically suspicious area.   In the right breast 8:00 axis anterior depth, there is a stable oval equal density circumscribed mass with associated T3-coil clip correlating with biopsy-proven benign fibroadenoma.   Left breast 9:30 axis 3 cm FN 1.3 x 0.5 x 0.7 cm oval complicated cyst is benign.    Pathology:  4/24/15 Excisional Biopsy, Left breast mass - Malignant Phyllodes Tumor with liposarcomatous differentiation.  Comment: The tumor measures 9 cm, demonstrates heterogeneously cellular stroma and stromal overgrowth. Stromal mitotic figures (mean: 9/10 hpf) are identified. The tumor is multinodular with invasive tumor borders. The tumor is broadly present <1 mm from blue inked surgical margin. Extensive necrosis is identified. Re-excision to obtain wide (>1 cm) margins would be prudent.    6/25/2015 Re-excision of margin -  Fibrocystic change consisting of dense sclerotic fibrosis, sclerosing adenosis, fibroadenomatoid change, focal ductal hyperplasia, and papillomatosis. No evidence of malignancy.    2021 Rright breast mass 8:00 axis 3 cmfn, US guided biopsy (measures 1.8 x 0.8 x 1.1 cm) - benign fibroadenoma (this mass was found to be stable on follow up imaging)    OB/GYN History:  Age at Menarche Onset: 10  Menopausal Status: premenopausal, LMP: Patient's last menstrual period was 2024.  Hysterectomy/Oophorectomy: no, neither  Hormonal birth control (duration): none  Pregnancy History:   Age at first live birth: 18  Hormone Replacement Therapy: No, none    Other:  MG breast density: BIRADS C  Prior thoracic RT: none  Ashkenazi Anabaptist descent: No    Family History:  Family History   Problem Relation Age of Onset    Thyroid cancer Mother         in her mid 30s    Depression Mother     Diabetes Mother     Hypertension Mother     Kidney disease Mother     Dementia Maternal Grandmother     Arthritis Maternal Grandmother     Thyroid cancer Maternal Grandfather         in his 60s    Asthma Paternal Grandmother     Lymphoma Maternal Uncle         mid 30s    Colon cancer Maternal Uncle         age unknown      Patient History:  Past Medical History:   Diagnosis Date    Anxiety disorder, unspecified     Essential (primary) hypertension     GERD (gastroesophageal reflux disease)     Malignant phyllodes tumor of breast, left     SVT (supraventricular tachycardia)        Active Problem List with Overview Notes    Diagnosis Date Noted    Epigastric pain 10/17/2023    History of malignant phyllodes tumor of breast 2023    Anxiety 2023    Migraine without aura and without status migrainosus, not intractable 2023    Left foot pain 2023     Order for Xray placed      Anxiety about health 2023    Generalized edema 2023     UA without protein  Kidney function normal  Ordering echocardiogram        Enlarged thyroid 2022     TSH and T4 /T3    Patient was no-show to ENT clinic at [facility] referring to Dr. Faustin      SVT (supraventricular tachycardia) 2022     Referring patient to Dr. Mabry    Patient asking for letter to be out from work for 4 more days letter provided      Chronic bilateral low back pain with bilateral sciatica 2022        Past Surgical History:   Procedure Laterality Date    BREAST BIOPSY      BREAST SURGERY       SECTION      COLONOSCOPY N/A 10/17/2023    Procedure: COLON;  Surgeon: Miky Ortega MD;  Location: [facility] ENDOSCOPY;  Service: Gastroenterology;  Laterality: N/A;    COLONOSCOPY, WITH 1 OR MORE BIOPSIES N/A 10/17/2023    Procedure: COLONOSCOPY, WITH 1 OR MORE BIOPSIES;  Surgeon: Miky Ortega MD;  Location: [facility] ENDOSCOPY;  Service: Gastroenterology;  Laterality: N/A;    EGD, WITH CLOSED BIOPSY  10/17/2023    Procedure: EGD, WITH CLOSED BIOPSY;  Surgeon: Miky Ortega MD;  Location: [facility] ENDOSCOPY;  Service: Gastroenterology;;    ESOPHAGOGASTRODUODENOSCOPY N/A 10/17/2023    Procedure: DOUBLE;  Surgeon: Miky Ortega MD;  Location: [facility] ENDOSCOPY;  Service: Gastroenterology;  Laterality: N/A;       Social History     Socioeconomic History    Marital status:    Tobacco Use    Smoking status: Former     Current packs/day: 0.00     Types: Cigarettes     Passive exposure: Never    Smokeless tobacco: Never    Tobacco comments:     Quit smoking more than 5 yrs ago.   Substance and Sexual Activity    Alcohol use: Never    Drug use: Never    Sexual activity: Yes     Partners: Male         There is no immunization history on file for this patient.    Medications/Allergies:    Current Outpatient Medications:     cholecalciferol, vitD3,/vit K2 (VITAMIN D3-VITAMIN K2 ORAL), Take by mouth., Disp: , Rfl:     fluticasone propionate (FLONASE) 50 mcg/actuation nasal spray, USE 1 SPRAY(S) IN EACH NOSTRIL ONCE DAILY FOR 14 DAYS, Disp:  ", Rfl:     mv-min/iron/folic/calcium/vitK (WOMEN'S MULTIVITAMIN ORAL), Take by mouth., Disp: , Rfl:     omeprazole (PRILOSEC) 40 MG capsule, Take 1 capsule (40 mg total) by mouth once daily., Disp: 30 capsule, Rfl: 2     Review of patient's allergies indicates:  No Known Allergies    Review of Systems:  ROS     Objective:     Vitals:  Vitals:    03/26/24 1404   BP: 131/86   BP Location: Right arm   Patient Position: Sitting   BP Method: Large (Automatic)   Pulse: 88   Resp: 20   Temp: 98.8 °F (37.1 °C)   TempSrc: Oral   SpO2: 97%   Weight: 124.7 kg (275 lb)   Height: 5' 7" (1.702 m)       Body mass index is 43.07 kg/m².     Physical Exam:  General: The patient is awake, alert and oriented times three. The patient is well nourished and in no acute distress.  Neck: There is no evidence of palpable cervical, supraclavicular or axillary adenopathy. The neck is supple. The thyroid is not enlarged.  Musculoskeletal: The patient has a normal range of motion of her bilateral upper extremities.  Chest: Examination of the chest wall fails to reveal any obvious abnormalities. Nonlabored breathing, symmetric expansion.  Breast:  Right:  Hypertrophic scar in the right breast 9:00 axis from prior core needle biopsy. Examination of right breast fails to reveal any dominant masses or areas of significant focal nodularity. The nipple is everted without evidence of discharge. There is no skin dimpling with movement of the pectoralis. There are no significant skin changes overlying the breast.   Left: Well healed lumpectomy scar lateral to the areola. Examination of the left breast fails to reveal any dominant masses or areas of significant focal nodularity. The nipple is everted without evidence of discharge. There is no skin dimpling with movement of the pectoralis. There are no significant skin changes overlying the breast.  Abdomen: The abdomen is soft, flat, nontender and nondistended.  Integumentary: no rashes or skin lesions " present  Neurologic: cranial nerves intact, no signs of peripheral neurological deficit, motor/sensory function intact    Assessment:     Patient Active Problem List   Diagnosis    Enlarged thyroid    SVT (supraventricular tachycardia)    Chronic bilateral low back pain with bilateral sciatica    Left foot pain    Anxiety about health    Generalized edema    History of malignant phyllodes tumor of breast    Anxiety    Migraine without aura and without status migrainosus, not intractable    Epigastric pain        Jazmine was seen today for follow-up.    Diagnoses and all orders for this visit:    At high risk for breast cancer  -     MRI Screening Breast W/WO Contrast, W/CAD, Frankie; Future    Mastalgia  -     Testosterone; Future  -     17-Hydroxyprogesterone; Future  -     Prolactin; Future  -     TSH; Future  -     Follicle Stimulating Hormone; Future  -     Sex Hormone Binding Globulin; Future  -     Luteinizing Hormone; Future  -     Estrogens, fractionated; Future    Oligomenorrhea, unspecified type  -     Testosterone; Future  -     17-Hydroxyprogesterone; Future  -     Prolactin; Future  -     TSH; Future  -     Follicle Stimulating Hormone; Future  -     Sex Hormone Binding Globulin; Future  -     Luteinizing Hormone; Future  -     Estrogens, fractionated; Future            Plan:       1. Lifestyle - Healthy lifestyle guidelines were reviewed. She was encouraged to engage in regular exercise, maintain a healthy body weight, and avoid excessive alcohol consumption. Healthy nutritional guidelines were also discussed. Self-breast examination was reviewed with the patient in detail and she was encouraged to perform this on a monthly basis.    2. Surveillance - Per ACR guidelines, patient is recommended for annual supplemental contrast enhanced breast MRI in this high-risk patient with a personal history of breast cancer diagnosed prior to age 50. I discussed that these screening MRIs are typically done 6 months  "apart from bilateral mammogram. Patient would like to proceed with this screening sooner due to concerns about breast pain. She understands that this may affect timing of future screening interval. RTC after MRI.    3. Prevention - Patient inquired about preventive bilateral mastectomy, which she primarily desires due to concerns about breast pain. Discussed negative genetic testing with no variants known to increase breast cancer risk. I discussed that I would first recommend trying to treat breast pain conservatively. Explained that a bilateral mastectomy would not necessarily improve pain symptoms and discussed possible side effect of surgery (post mastectomy pain syndrome) which occurs in some patients and is chronic. We can discuss further at follow up after she has high risk screening.     4. Genetics - Patient underwent genetic testing with Dr. Treasure Bess, which was negative revealing VUSs in the STK11 gene and PMS2 gene. This testing was done in 2015 and at this time literature review showed concerning data that the PMS2 gene was "probably damaging". Today, I discussed these variants with Dr. Bess to see if there is more up to date literature:   Dr. Bess states that the STK11 variant is now determined to be benign. My own personal review using the ClinVar archive for classification of human variations also states that current evidence shows that the STK11 variant c.598-8C>T is benign/likely benign.   Dr. Bess has requested an update from [employer] for the PMS2 variant. My own personal review using the ClinVar archive for classification of human variations states current evidence show that PMS2 c.2108C>T ([id number]) has conflicting classifications of pathogenicity.    5. Other routine screening exams:   -  Recommend annual follow up with PCP. Patient establishes care with new PCP soon.   -  Recommend annual follow up with GYN for pap smears/gynecologic exams and CBE. Patient establishes care with " new OBGYN soon.   -  Patient underwent colonoscopy 10/2023 which was benign. Please request progress and OP notes from Dr. Miky Ortega    6. Mastalgia -   I discussed ways to reduce breast pain/tenderness. Avoid caffeine (coffee, teas, chocolate, sodas). Drinking alcohol may also increase the risk of fibrocystic changes and breast pain. I also advised to wear a good, supportive bra both day and night when symptoms are worse. Avoid contact sports and other activities which could cause injury to the breasts. Patient prescribed Voltaren gel which she may use up to 4 times per day. She may also consider taking Vitamin E/Primrose Oil supplementation to reduce pain.   Patient currently does not have an OBGYN and given symptoms of oligomenorrhea and worsening mastalgia, I will order lab testing to rule out other causes of mastalgia such as PCOS or thyroid disorders. She states she plans to establish care with OBGYN soon and will follow up with them for further eval/management.  If left axilla/arm pain as well as numbness in left hand continues at follow up, will refer to ortho.         All of her questions were answered. She was advised to call if she develops any questions or concerns.    Naomi Childers PA-C           --------------------------------------------------------------------------------------------------------------  Total Time spent on date of visit: 76 minutes.  Total time includes both face-to-face and non-face-to-face time personally spent by myself on the day of the visit.    Non-face-to-face time included:  _X_ preparing to see the patient such as reviewing the patient record  _x_ obtaining and reviewing separately obtained history  _X_ independently interpreting results  _X_ documenting clinical information in electronic health record.    Face-to-face time included:  _X_ performing an appropriate history and examination  _X_ communicating results to the patient  _X_ counseling and educating the  patient  _x_ ordering appropriate medications  _x_ ordering appropriate tests  _X_ ordering appropriate procedures (including follow-up)  _X_ answering any questions the patient had

## 2024-03-26 ENCOUNTER — OFFICE VISIT (OUTPATIENT)
Dept: SURGERY | Facility: CLINIC | Age: 34
End: 2024-03-26
Payer: COMMERCIAL

## 2024-03-26 VITALS
DIASTOLIC BLOOD PRESSURE: 86 MMHG | OXYGEN SATURATION: 97 % | HEART RATE: 88 BPM | TEMPERATURE: 99 F | SYSTOLIC BLOOD PRESSURE: 131 MMHG | HEIGHT: 67 IN | BODY MASS INDEX: 43.16 KG/M2 | RESPIRATION RATE: 20 BRPM | WEIGHT: 275 LBS

## 2024-03-26 DIAGNOSIS — Z91.89 AT HIGH RISK FOR BREAST CANCER: Primary | ICD-10-CM

## 2024-03-26 DIAGNOSIS — N64.4 MASTALGIA: ICD-10-CM

## 2024-03-26 DIAGNOSIS — N91.5 OLIGOMENORRHEA, UNSPECIFIED TYPE: ICD-10-CM

## 2024-03-26 PROCEDURE — 99215 OFFICE O/P EST HI 40 MIN: CPT | Mod: S$GLB,,, | Performed by: PHYSICIAN ASSISTANT

## 2024-03-26 PROCEDURE — 3075F SYST BP GE 130 - 139MM HG: CPT | Mod: CPTII,S$GLB,, | Performed by: PHYSICIAN ASSISTANT

## 2024-03-26 PROCEDURE — 99417 PROLNG OP E/M EACH 15 MIN: CPT | Mod: S$GLB,,, | Performed by: PHYSICIAN ASSISTANT

## 2024-03-26 PROCEDURE — 99999 PR PBB SHADOW E&M-EST. PATIENT-LVL IV: CPT | Mod: PBBFAC,,, | Performed by: PHYSICIAN ASSISTANT

## 2024-03-26 PROCEDURE — 1160F RVW MEDS BY RX/DR IN RCRD: CPT | Mod: CPTII,S$GLB,, | Performed by: PHYSICIAN ASSISTANT

## 2024-03-26 PROCEDURE — 1159F MED LIST DOCD IN RCRD: CPT | Mod: CPTII,S$GLB,, | Performed by: PHYSICIAN ASSISTANT

## 2024-03-26 PROCEDURE — 3008F BODY MASS INDEX DOCD: CPT | Mod: CPTII,S$GLB,, | Performed by: PHYSICIAN ASSISTANT

## 2024-03-26 PROCEDURE — 3079F DIAST BP 80-89 MM HG: CPT | Mod: CPTII,S$GLB,, | Performed by: PHYSICIAN ASSISTANT

## 2024-03-26 RX ORDER — FLUTICASONE PROPIONATE 50 MCG
SPRAY, SUSPENSION (ML) NASAL
COMMUNITY

## 2024-03-26 NOTE — PATIENT INSTRUCTIONS
What is breast pain?  Breast pain (mastalgia) is a common type of discomfort among women, affecting as many as seven in 10 women at some point in their lives.  About 10 percent of women have moderate to severe breast pain more than five days a month. In some cases, severe breast pain lasts throughout the menstrual cycles.  The symptom occurs most frequently in young, premenopausal and perimenopausal women.  Breast pain alone rarely signifies breast cancer. Still, if you have unexplained breast pain that persists, get checked by your provider.     Causes of breast pain  Most of the time, it's not possible to identify the exact cause of breast pain. Likely contributors are hormonal changes, anatomical issues (breast trauma, prior breast surgery, or chest wall pain), breast size, and medication use (oral contraceptives, infertility treatments, estrogen/progesterone hormone therapy, and some antidepressants). Sometimes, breast pain is also extramammary (meaning it is caused by something not within the breast - ex. Muscles stains in the shoulder or nerve impingement in the neck.     Treatments and supplements for breast pain  Use hot or cold compresses on your breasts.  Wear a firm support bra, fitted by a professional if possible.  Wear a sports bra during exercise and while sleeping, especially when your breast may be more sensitive.  Limit or eliminate caffeine, a dietary change many women swear by.  Decrease the fat in your diet.  Use a pain reliever, such as acetaminophen (Tylenol) or ibuprofen (Advil, Motrin) to alleviate breast pain.  May try Voltaren gel topical applications (2 g up to 4 times per day) to affected area of the breast.  Keep a journal noting when you experience breast pain and other symptoms, to determine if your pain is cyclic.  Vitamin E can help with breast pain (up to1,200 IU per day). Many women have found it to be helpful, but it usually takes up to 2 months of taking this to see a  change.  Evening primrose oil; this supplement appears to change the balance of fatty acids in your cells, which may reduce breast pain. You can take a 1000 mg capsule up to three times a day.     Adopted from [email or web address]

## 2024-03-28 ENCOUNTER — LAB VISIT (OUTPATIENT)
Dept: LAB | Facility: HOSPITAL | Age: 34
End: 2024-03-28
Attending: PHYSICIAN ASSISTANT
Payer: COMMERCIAL

## 2024-03-28 DIAGNOSIS — N91.5 OLIGOMENORRHEA, UNSPECIFIED TYPE: ICD-10-CM

## 2024-03-28 DIAGNOSIS — N64.4 MASTALGIA: ICD-10-CM

## 2024-03-28 LAB
FSH SERPL-ACNC: 4.81 MIU/ML
LH SERPL-ACNC: 1.74 MIU/ML
PROLACTIN LEVEL (OLG): 31.15 NG/ML (ref 5.18–26.53)
TESTOST SERPL-MCNC: 28.06 NG/DL (ref 13.84–53.35)
TSH SERPL-ACNC: 0.4 UIU/ML (ref 0.35–4.94)

## 2024-03-28 PROCEDURE — 83001 ASSAY OF GONADOTROPIN (FSH): CPT

## 2024-03-28 PROCEDURE — 84146 ASSAY OF PROLACTIN: CPT

## 2024-03-28 PROCEDURE — 83498 ASY HYDROXYPROGESTERONE 17-D: CPT

## 2024-03-28 PROCEDURE — 83002 ASSAY OF GONADOTROPIN (LH): CPT

## 2024-03-28 PROCEDURE — 84443 ASSAY THYROID STIM HORMONE: CPT

## 2024-03-28 PROCEDURE — 84270 ASSAY OF SEX HORMONE GLOBUL: CPT

## 2024-03-28 PROCEDURE — 36415 COLL VENOUS BLD VENIPUNCTURE: CPT

## 2024-03-28 PROCEDURE — 82671 ASSAY OF ESTROGENS: CPT

## 2024-03-28 PROCEDURE — 84403 ASSAY OF TOTAL TESTOSTERONE: CPT

## 2024-03-29 LAB — SHBG SERPL-SCNC: 116.8 NMOL/L (ref 18.2–135.5)

## 2024-04-01 LAB
ESTRADIOL SERPL-MCNC: 59 PG/ML
ESTRONE SERPL-MCNC: 133 PG/ML

## 2024-04-02 LAB — 17OHP SERPL-MCNC: <40 NG/DL

## 2024-04-03 DIAGNOSIS — E22.1 HYPERPROLACTINEMIA: Primary | ICD-10-CM

## 2024-06-18 DIAGNOSIS — G43.E01 CHRONIC MIGRAINE WITH AURA AND WITH STATUS MIGRAINOSUS, NOT INTRACTABLE: Primary | ICD-10-CM

## 2024-07-22 ENCOUNTER — APPOINTMENT (OUTPATIENT)
Dept: RADIOLOGY | Facility: HOSPITAL | Age: 34
End: 2024-07-22
Payer: COMMERCIAL

## 2024-07-22 DIAGNOSIS — G43.E01 CHRONIC MIGRAINE WITH AURA AND WITH STATUS MIGRAINOSUS, NOT INTRACTABLE: ICD-10-CM

## 2024-07-22 PROCEDURE — 25500020 PHARM REV CODE 255

## 2024-07-22 PROCEDURE — 70553 MRI BRAIN STEM W/O & W/DYE: CPT | Mod: TC

## 2024-07-22 PROCEDURE — A9577 INJ MULTIHANCE: HCPCS

## 2024-07-22 RX ADMIN — GADOBENATE DIMEGLUMINE 20 ML: 529 INJECTION, SOLUTION INTRAVENOUS at 02:07

## 2024-08-22 ENCOUNTER — HOSPITAL ENCOUNTER (OUTPATIENT)
Dept: RADIOLOGY | Facility: HOSPITAL | Age: 34
Discharge: HOME | End: 2024-08-22
Payer: COMMERCIAL

## 2024-08-22 DIAGNOSIS — R07.89 XIPHOID SYNDROME: Primary | ICD-10-CM

## 2024-08-22 DIAGNOSIS — R07.89 XIPHOID SYNDROME: ICD-10-CM

## 2024-08-22 PROCEDURE — 71046 X-RAY EXAM CHEST 2 VIEWS: CPT | Mod: TC

## (undated) DEVICE — CONTAINER SPECIMEN SCREW 4OZ

## (undated) DEVICE — FORCEP BX LG CAP 2.8MMX240CM

## (undated) DEVICE — CONTAINER SPEC STRL PATH 4OZ

## (undated) DEVICE — BAG LABGUARD BIOHAZARD 6X9IN

## (undated) DEVICE — KIT SURGICAL COLON .25 1.1OZ

## (undated) DEVICE — BLOCK BLOX BITE DENT RIM 54FR

## (undated) DEVICE — UNDERPAD DISPOSABLE 30X30IN

## (undated) DEVICE — TUBING O2 FEMALE CONN 13FT

## (undated) DEVICE — TIP SUCTION YANKAUER

## (undated) DEVICE — SOL IRRI STRL WATER 1000ML

## (undated) DEVICE — ADAPTER DUAL NSL LUER M-M 7FT

## (undated) DEVICE — KIT CANIST SUCTION 1200CC

## (undated) DEVICE — COLLECTION SPECIMEN NEPTUNE